# Patient Record
Sex: MALE | HISPANIC OR LATINO | Employment: FULL TIME | URBAN - METROPOLITAN AREA
[De-identification: names, ages, dates, MRNs, and addresses within clinical notes are randomized per-mention and may not be internally consistent; named-entity substitution may affect disease eponyms.]

---

## 2018-07-11 ENCOUNTER — APPOINTMENT (OUTPATIENT)
Dept: ADMISSIONS | Facility: MEDICAL CENTER | Age: 53
End: 2018-07-11
Attending: SURGERY
Payer: COMMERCIAL

## 2018-07-11 RX ORDER — ACETAMINOPHEN 500 MG
1000 TABLET ORAL DAILY
COMMUNITY
End: 2023-02-08

## 2018-07-18 ENCOUNTER — HOSPITAL ENCOUNTER (OUTPATIENT)
Facility: MEDICAL CENTER | Age: 53
End: 2018-07-18
Attending: SURGERY | Admitting: SURGERY
Payer: COMMERCIAL

## 2018-07-18 VITALS
HEART RATE: 75 BPM | TEMPERATURE: 98.3 F | HEIGHT: 64 IN | OXYGEN SATURATION: 98 % | RESPIRATION RATE: 16 BRPM | DIASTOLIC BLOOD PRESSURE: 80 MMHG | SYSTOLIC BLOOD PRESSURE: 150 MMHG | BODY MASS INDEX: 43.66 KG/M2 | WEIGHT: 255.73 LBS

## 2018-07-18 DIAGNOSIS — G89.18 POSTOPERATIVE PAIN: ICD-10-CM

## 2018-07-18 PROCEDURE — A6402 STERILE GAUZE <= 16 SQ IN: HCPCS | Performed by: SURGERY

## 2018-07-18 PROCEDURE — 160039 HCHG SURGERY MINUTES - EA ADDL 1 MIN LEVEL 3: Performed by: SURGERY

## 2018-07-18 PROCEDURE — A9270 NON-COVERED ITEM OR SERVICE: HCPCS

## 2018-07-18 PROCEDURE — 160009 HCHG ANES TIME/MIN: Performed by: SURGERY

## 2018-07-18 PROCEDURE — 700111 HCHG RX REV CODE 636 W/ 250 OVERRIDE (IP)

## 2018-07-18 PROCEDURE — 700101 HCHG RX REV CODE 250

## 2018-07-18 PROCEDURE — 160028 HCHG SURGERY MINUTES - 1ST 30 MINS LEVEL 3: Performed by: SURGERY

## 2018-07-18 PROCEDURE — 160035 HCHG PACU - 1ST 60 MINS PHASE I: Performed by: SURGERY

## 2018-07-18 PROCEDURE — 160048 HCHG OR STATISTICAL LEVEL 1-5: Performed by: SURGERY

## 2018-07-18 PROCEDURE — C1781 MESH (IMPLANTABLE): HCPCS | Performed by: SURGERY

## 2018-07-18 PROCEDURE — 160002 HCHG RECOVERY MINUTES (STAT): Performed by: SURGERY

## 2018-07-18 PROCEDURE — 700102 HCHG RX REV CODE 250 W/ 637 OVERRIDE(OP)

## 2018-07-18 PROCEDURE — 160036 HCHG PACU - EA ADDL 30 MINS PHASE I: Performed by: SURGERY

## 2018-07-18 PROCEDURE — 500380 HCHG DRAIN, PENROSE 1/4X12: Performed by: SURGERY

## 2018-07-18 PROCEDURE — 501838 HCHG SUTURE GENERAL: Performed by: SURGERY

## 2018-07-18 DEVICE — MESH PROGRIP LEFT (1/EA): Type: IMPLANTABLE DEVICE | Site: INGUINAL | Status: FUNCTIONAL

## 2018-07-18 DEVICE — MESH VENTRALEX ST W/STRAP - 4.3CM SMALL (1EA/CA): Type: IMPLANTABLE DEVICE | Site: UMBILICAL | Status: FUNCTIONAL

## 2018-07-18 RX ORDER — BUPIVACAINE HYDROCHLORIDE AND EPINEPHRINE 5; 5 MG/ML; UG/ML
INJECTION, SOLUTION PERINEURAL
Status: DISCONTINUED | OUTPATIENT
Start: 2018-07-18 | End: 2018-07-18 | Stop reason: HOSPADM

## 2018-07-18 RX ORDER — HYDROCODONE BITARTRATE AND ACETAMINOPHEN 5; 325 MG/1; MG/1
1-2 TABLET ORAL EVERY 6 HOURS PRN
Qty: 30 TAB | Refills: 0 | Status: SHIPPED | OUTPATIENT
Start: 2018-07-18 | End: 2018-07-25

## 2018-07-18 RX ORDER — SODIUM CHLORIDE, SODIUM LACTATE, POTASSIUM CHLORIDE, CALCIUM CHLORIDE 600; 310; 30; 20 MG/100ML; MG/100ML; MG/100ML; MG/100ML
INJECTION, SOLUTION INTRAVENOUS CONTINUOUS
Status: DISCONTINUED | OUTPATIENT
Start: 2018-07-18 | End: 2018-07-18 | Stop reason: HOSPADM

## 2018-07-18 RX ORDER — BUPIVACAINE HYDROCHLORIDE AND EPINEPHRINE 5; 5 MG/ML; UG/ML
INJECTION, SOLUTION EPIDURAL; INTRACAUDAL; PERINEURAL
Status: DISCONTINUED
Start: 2018-07-18 | End: 2018-07-18 | Stop reason: HOSPADM

## 2018-07-18 RX ORDER — HYDROMORPHONE HYDROCHLORIDE 2 MG/ML
INJECTION, SOLUTION INTRAMUSCULAR; INTRAVENOUS; SUBCUTANEOUS
Status: COMPLETED
Start: 2018-07-18 | End: 2018-07-18

## 2018-07-18 RX ADMIN — FENTANYL CITRATE 50 MCG: 50 INJECTION, SOLUTION INTRAMUSCULAR; INTRAVENOUS at 15:55

## 2018-07-18 RX ADMIN — HYDROMORPHONE HYDROCHLORIDE 0.2 MG: 2 INJECTION INTRAMUSCULAR; INTRAVENOUS; SUBCUTANEOUS at 17:02

## 2018-07-18 RX ADMIN — HYDROCODONE BITARTRATE AND ACETAMINOPHEN 30 ML: 2.5; 108 SOLUTION ORAL at 15:52

## 2018-07-18 RX ADMIN — HYDROMORPHONE HYDROCHLORIDE 0.2 MG: 2 INJECTION INTRAMUSCULAR; INTRAVENOUS; SUBCUTANEOUS at 17:25

## 2018-07-18 RX ADMIN — FENTANYL CITRATE 50 MCG: 50 INJECTION, SOLUTION INTRAMUSCULAR; INTRAVENOUS at 16:05

## 2018-07-18 RX ADMIN — HYDROMORPHONE HYDROCHLORIDE 0.2 MG: 2 INJECTION INTRAMUSCULAR; INTRAVENOUS; SUBCUTANEOUS at 16:44

## 2018-07-18 RX ADMIN — SODIUM CHLORIDE, SODIUM LACTATE, POTASSIUM CHLORIDE, CALCIUM CHLORIDE: 600; 310; 30; 20 INJECTION, SOLUTION INTRAVENOUS at 13:20

## 2018-07-18 ASSESSMENT — PAIN SCALES - GENERAL
PAINLEVEL_OUTOF10: 4
PAINLEVEL_OUTOF10: 8
PAINLEVEL_OUTOF10: 0
PAINLEVEL_OUTOF10: 3
PAINLEVEL_OUTOF10: 4
PAINLEVEL_OUTOF10: 8
PAINLEVEL_OUTOF10: 0

## 2018-07-18 NOTE — OP REPORT
DATE OF SERVICE:  07/18/2018    PREOPERATIVE DIAGNOSES:  1.  Umbilical hernia.  2.  Left inguinal hernia.    POSTOPERATIVE DIAGNOSES:  1.  Umbilical hernia.  2.  Left inguinal hernia.    SURGEON:  J Carlos Myers MD    ASSISTANT:  LINDA Moscoso.    PROCEDURES PERFORMED:  1.  Umbilical hernia repair.  2.  Open left inguinal hernia repair.    ANESTHESIOLOGIST:  Heath Olivo MD    ANESTHESIA:  General endotracheal.    SPECIMENS:  None.    ESTIMATED BLOOD LOSS:  30 mL    INDICATIONS:  This patient is a very pleasant 52-year-old male who presented   to my office with symptomatic umbilical hernia as well as a symptomatic left   inguinal hernia.  These were causing him pain.  We discussed repair.  We   discussed risks including bleeding, infection, recurrence, and injury to   bowel, nerves or other structures.  He understood these risks and is willing   to proceed.    FINDINGS:  There was a 1.5 cm umbilical hernia defect containing omental fat.    There was a left inguinal hernia with a large cord lipoma and a small   indirect component and the direct component as well.    PROCEDURE IN DETAIL:  Informed consent was obtained.  Patient was brought back   operating room, placed in a supine position on the operating table.  General   anesthesia was induced.  Patient's abdomen and left groin were prepped and   draped in usual sterile fashion, preincision timeout procedure was performed   and preincision antibiotics were given.  After infiltration of local   anesthetic, the infraumbilical skin incision was made, dissected down to the   subcutaneous tissues.  The umbilical skin was dissected off of the hernia   contents.  There was a large amount of preperitoneal and omental fat.    Dissected this away from the surrounding tissues and away from the fascial   edges.  The contents were reduced.  A 4 cm Ventralex mesh was used for   reconstruction.  This was sutured in place in underlay fashion using 0   Ethibond sutures.   The fascia was then closed over the top again with 0   Ethibond sutures.  The umbilical stalk was cinched down to the fascia with 3-0   Vicryl suture.  The skin was closed with deep dermal 3-0 Vicryl sutures and a   running subcuticular 4-0 Vicryl.  Wound was dressed with benzoin,   Steri-Strips, cotton ball and Tegaderm.    We then turned our attention to the inguinal hernia.  After infiltration of   local anesthetic and an ilioinguinal nerve block, a skin incision was made in   the left groin.  We dissected down through the subcutaneous tissues to the   external oblique which was incised up to the external ring.  The cord   structures and hernia contents were encircled at the pubic tubercle with a   Penrose drain.  We dissected away large cord lipoma which was transected at   the level of the internal ring.  There were small direct component which was   dissected away from the cord structures.  There also appeared to be some   laxity in the area of a direct hernia.  The inguinal canal was reconstructed   using a ProGrip mesh.  This was tacked to the pubic tubercle with 2-0 Ethibond   sutures.  The wings of the mesh were brought around the cord structures and placed on   the inguinal shelving edge inferiorly and the conjoined tendon superiorly.    The mesh was brought down around the cord structures to recreate the internal   ring.  The external oblique was closed over the top to recreate the external   ring.  Urbano fascia was closed with interrupted 3-0 Vicryl sutures.  The skin   incision was closed with a running subcuticular 4-0 Vicryl.  The wound was   dressed with benzoin, Steri-Strips, gauze and Tegaderm.  Patient tolerated the   procedure well, was extubated and taken to recovery unit in a stable   condition.  All sponge and instrument counts correct.       ____________________________________     MD PRASHANT Foster / SURYA    DD:  07/18/2018 15:43:06  DT:  07/18/2018 16:18:31    D#:  [id number]  Job#:   [id number]

## 2018-07-18 NOTE — DISCHARGE SUMMARY
Hernia Instrucciones:    1. ACTIVIDADES: A vanessa salida del hospital, el día de la cirugía se solicita que lo hace ninguna actividad física significativa y limitar las actividades mentales, fransisco usted ha tenido la sedación. El día después de la cirugía, usted puede reanudar poly actividades de la david diaria, pedro luis endy cuatro semanas, se recomienda que usted lo hace no hay actividades extenuantes o levantar objetos pesados ??(de más de 15 libras).    2. CONDUCCIÓN: Es posible que conduce cada vez que están fuera de medicamentos para el dolor y son capaces de realizar las actividades necesarias para conducir, es decir, torneado, flexión, torsión, etc.    3. HERIDA: No es raro que los pacientes experimentan hinchazón e incluso hematomas en el sitio de la reparación de la hernia. Con las hernias inguinales, a veces los moretones y la hinchazón pueden extenderse en el pene o en el escroto de los pacientes masculinos. Short Pump se resolverá en los próximos días.    4. ICE: por favor, utilice hielo en la herida para disminuir la hinchazón endy las primeras 24 horas y luego interrumpir.    5. Baño: El apósito se puede quitar dos días después de la cirugía y la herida puede entonces ser humedecido en ashu ducha fransisco normal, pedro luis evitar la inmersión en agua (baño de riki) endy al menos ashu semana.    6. DOLOR DE MEDICAMENTOS: Se le dará ashu receta para medicamentos para el dolor al momento del rosetta. Por favor, tome esto fransisco se indica. Es importante recordar que no debe mendez medicamentos con el estómago vacío ya que esto puede causar náuseas.    7. función intestinal: Después de la reparación de la hernia, no es infrecuente que los pacientes experimentan estreñimiento. Short Pump se debe a la disminución de los niveles de actividad, así fransisco medicamentos para el dolor. Es posible que desee utilizar un ablandador fecal que comienza inmediatamente después de la cirugía, y es posible o no posible que necesite usar un laxante (leche  de magnesia, Ex-Lax; Senokot, etc.) también.    8 Llama SI TIENE: (1) La fiebre a más de 1.010 M, (2) el pecho inusual o dolor en las piernas, (3) Drenaje o líquido desde la incisión que puede ser mal olor, aumento de sensibilidad o dolor en la herida o la herida bordes ya no están juntos, enrojecimiento o hinchazón en el sitio de la incisión. Por favor, no dude en llamar con cualquier otra pregunta.    9. EDI: Póngase en contacto con nuestra oficina al [phone] para ashu edi de seguimiento de 1 a 2 semanas después de vanessa procedimiento.    Si usted tiene alguna pregunta adicional, por favor no dude en llamar a la oficina y hablar con mí o el médico de anna.    Dirección de la oficina:  [address], [place] [address]      J Carlos GARCIA.

## 2018-07-18 NOTE — OR NURSING
1539  Pt arrived to PACU from OR with Anesthesia and RN.  No airway in place.  5L O2 via mask.  VSS.  Gauze drsgs observed to umbilicus and L groin sites.  Abd and groin soft to palpation, no ecchymoses noted.    1552  Oral hydrocodone given for moderate pain    1555  Fentanyl given for persistent moderate pain    1605  Second dose of fentanyl given for constant pain.  Pt restless, fidgety, moaning and bracing abd.    1620  Pt sleeping comfortably.    1644 Report to Kaylynn CASTRO.

## 2018-07-18 NOTE — OR NURSING
1644  Dilaudid given for persistent moderate pain.  Wife at bedside.    1655  Pt moved to bed 8 in PACU.  Tolerated the transfer well.      1702  Second Dilaudid dose given for persistent pain    1710  30min trial on RA starts now    1717  Report given to Mimi CASTRO

## 2019-09-04 ENCOUNTER — HOSPITAL ENCOUNTER (OUTPATIENT)
Dept: LAB | Facility: MEDICAL CENTER | Age: 54
End: 2019-09-04
Attending: ORTHOPAEDIC SURGERY
Payer: COMMERCIAL

## 2019-09-04 LAB
EST. AVERAGE GLUCOSE BLD GHB EST-MCNC: 137 MG/DL
HBA1C MFR BLD: 6.4 % (ref 0–5.6)

## 2019-09-04 PROCEDURE — 83036 HEMOGLOBIN GLYCOSYLATED A1C: CPT

## 2019-09-04 PROCEDURE — 36415 COLL VENOUS BLD VENIPUNCTURE: CPT

## 2019-09-05 ENCOUNTER — HOSPITAL ENCOUNTER (OUTPATIENT)
Dept: LAB | Facility: MEDICAL CENTER | Age: 54
End: 2019-09-05
Attending: NURSE PRACTITIONER
Payer: COMMERCIAL

## 2019-09-05 LAB
ALBUMIN SERPL BCP-MCNC: 4.2 G/DL (ref 3.2–4.9)
ALBUMIN/GLOB SERPL: 1.2 G/DL
ALP SERPL-CCNC: 61 U/L (ref 30–99)
ALT SERPL-CCNC: 30 U/L (ref 2–50)
ANION GAP SERPL CALC-SCNC: 11 MMOL/L (ref 0–11.9)
AST SERPL-CCNC: 26 U/L (ref 12–45)
BASOPHILS # BLD AUTO: 0.8 % (ref 0–1.8)
BASOPHILS # BLD: 0.08 K/UL (ref 0–0.12)
BILIRUB SERPL-MCNC: 0.7 MG/DL (ref 0.1–1.5)
BUN SERPL-MCNC: 17 MG/DL (ref 8–22)
CALCIUM SERPL-MCNC: 9.3 MG/DL (ref 8.5–10.5)
CHLORIDE SERPL-SCNC: 103 MMOL/L (ref 96–112)
CHOLEST SERPL-MCNC: 139 MG/DL (ref 100–199)
CO2 SERPL-SCNC: 24 MMOL/L (ref 20–33)
CREAT SERPL-MCNC: 0.95 MG/DL (ref 0.5–1.4)
EOSINOPHIL # BLD AUTO: 0.15 K/UL (ref 0–0.51)
EOSINOPHIL NFR BLD: 1.5 % (ref 0–6.9)
ERYTHROCYTE [DISTWIDTH] IN BLOOD BY AUTOMATED COUNT: 43.3 FL (ref 35.9–50)
EST. AVERAGE GLUCOSE BLD GHB EST-MCNC: 137 MG/DL
GLOBULIN SER CALC-MCNC: 3.4 G/DL (ref 1.9–3.5)
GLUCOSE SERPL-MCNC: 80 MG/DL (ref 65–99)
HBA1C MFR BLD: 6.4 % (ref 0–5.6)
HCT VFR BLD AUTO: 45.9 % (ref 42–52)
HDLC SERPL-MCNC: 45 MG/DL
HGB BLD-MCNC: 15 G/DL (ref 14–18)
IMM GRANULOCYTES # BLD AUTO: 0.03 K/UL (ref 0–0.11)
IMM GRANULOCYTES NFR BLD AUTO: 0.3 % (ref 0–0.9)
LDLC SERPL CALC-MCNC: 80 MG/DL
LYMPHOCYTES # BLD AUTO: 3.62 K/UL (ref 1–4.8)
LYMPHOCYTES NFR BLD: 37.1 % (ref 22–41)
MCH RBC QN AUTO: 30.5 PG (ref 27–33)
MCHC RBC AUTO-ENTMCNC: 32.7 G/DL (ref 33.7–35.3)
MCV RBC AUTO: 93.3 FL (ref 81.4–97.8)
MONOCYTES # BLD AUTO: 0.62 K/UL (ref 0–0.85)
MONOCYTES NFR BLD AUTO: 6.3 % (ref 0–13.4)
NEUTROPHILS # BLD AUTO: 5.27 K/UL (ref 1.82–7.42)
NEUTROPHILS NFR BLD: 54 % (ref 44–72)
NRBC # BLD AUTO: 0 K/UL
NRBC BLD-RTO: 0 /100 WBC
PLATELET # BLD AUTO: 257 K/UL (ref 164–446)
PMV BLD AUTO: 10.3 FL (ref 9–12.9)
POTASSIUM SERPL-SCNC: 3.9 MMOL/L (ref 3.6–5.5)
PROT SERPL-MCNC: 7.6 G/DL (ref 6–8.2)
RBC # BLD AUTO: 4.92 M/UL (ref 4.7–6.1)
SODIUM SERPL-SCNC: 138 MMOL/L (ref 135–145)
TRIGL SERPL-MCNC: 70 MG/DL (ref 0–149)
WBC # BLD AUTO: 9.8 K/UL (ref 4.8–10.8)

## 2019-09-05 PROCEDURE — 83036 HEMOGLOBIN GLYCOSYLATED A1C: CPT

## 2019-09-05 PROCEDURE — 36415 COLL VENOUS BLD VENIPUNCTURE: CPT

## 2019-09-05 PROCEDURE — 80053 COMPREHEN METABOLIC PANEL: CPT

## 2019-09-05 PROCEDURE — 85025 COMPLETE CBC W/AUTO DIFF WBC: CPT

## 2019-09-05 PROCEDURE — 80061 LIPID PANEL: CPT

## 2019-09-23 ENCOUNTER — HOSPITAL ENCOUNTER (OUTPATIENT)
Dept: RADIOLOGY | Facility: MEDICAL CENTER | Age: 54
End: 2019-09-23
Attending: PHYSICIAN ASSISTANT
Payer: MEDICAID

## 2019-09-23 ENCOUNTER — HOSPITAL ENCOUNTER (OUTPATIENT)
Dept: LAB | Facility: MEDICAL CENTER | Age: 54
End: 2019-09-23
Attending: PHYSICIAN ASSISTANT
Payer: MEDICAID

## 2019-09-23 ENCOUNTER — APPOINTMENT (OUTPATIENT)
Dept: ADMISSIONS | Facility: MEDICAL CENTER | Age: 54
DRG: 469 | End: 2019-09-23
Payer: COMMERCIAL

## 2019-09-23 DIAGNOSIS — M25.552 LEFT HIP PAIN: ICD-10-CM

## 2019-09-23 LAB
APPEARANCE FLD: CLEAR
BODY FLD TYPE: NORMAL
BODY FLD TYPE: NORMAL
COLOR FLD: COLORLESS
CRYSTALS FLD MICRO: NORMAL
GRAM STN SPEC: NORMAL
LYMPHOCYTES NFR FLD: 1 %
RBC # FLD: 53 CELLS/UL
SIGNIFICANT IND 70042: NORMAL
SITE SITE: NORMAL
SOURCE SOURCE: NORMAL
WBC # FLD: 1 CELLS/UL

## 2019-09-23 PROCEDURE — 89051 BODY FLUID CELL COUNT: CPT

## 2019-09-23 PROCEDURE — 87075 CULTR BACTERIA EXCEPT BLOOD: CPT

## 2019-09-23 PROCEDURE — 87102 FUNGUS ISOLATION CULTURE: CPT

## 2019-09-23 PROCEDURE — 87205 SMEAR GRAM STAIN: CPT

## 2019-09-23 PROCEDURE — 89060 EXAM SYNOVIAL FLUID CRYSTALS: CPT

## 2019-09-23 PROCEDURE — 700117 HCHG RX CONTRAST REV CODE 255: Performed by: PHYSICIAN ASSISTANT

## 2019-09-23 PROCEDURE — 77002 NEEDLE LOCALIZATION BY XRAY: CPT

## 2019-09-23 PROCEDURE — 20610 DRAIN/INJ JOINT/BURSA W/O US: CPT | Mod: LT

## 2019-09-23 PROCEDURE — 87070 CULTURE OTHR SPECIMN AEROBIC: CPT

## 2019-09-23 RX ORDER — TRIAMCINOLONE ACETONIDE 40 MG/ML
40 INJECTION, SUSPENSION INTRA-ARTICULAR; INTRAMUSCULAR ONCE
Status: DISCONTINUED | OUTPATIENT
Start: 2019-09-23 | End: 2019-09-23

## 2019-09-23 RX ORDER — BUPIVACAINE HYDROCHLORIDE 5 MG/ML
10 INJECTION, SOLUTION EPIDURAL; INTRACAUDAL ONCE
Status: DISCONTINUED | OUTPATIENT
Start: 2019-09-23 | End: 2019-09-24 | Stop reason: HOSPADM

## 2019-09-23 RX ADMIN — IOHEXOL 10 ML: 300 INJECTION, SOLUTION INTRAVENOUS at 15:00

## 2019-09-24 ENCOUNTER — ANESTHESIA EVENT (OUTPATIENT)
Dept: SURGERY | Facility: MEDICAL CENTER | Age: 54
DRG: 469 | End: 2019-09-24
Payer: COMMERCIAL

## 2019-09-24 ENCOUNTER — APPOINTMENT (OUTPATIENT)
Dept: ADMISSIONS | Facility: MEDICAL CENTER | Age: 54
DRG: 469 | End: 2019-09-24
Attending: ORTHOPAEDIC SURGERY
Payer: COMMERCIAL

## 2019-09-24 DIAGNOSIS — Z01.810 PRE-OPERATIVE CARDIOVASCULAR EXAMINATION: ICD-10-CM

## 2019-09-24 DIAGNOSIS — Z01.812 PRE-OPERATIVE LABORATORY EXAMINATION: ICD-10-CM

## 2019-09-24 PROCEDURE — 36415 COLL VENOUS BLD VENIPUNCTURE: CPT

## 2019-09-24 PROCEDURE — 85730 THROMBOPLASTIN TIME PARTIAL: CPT

## 2019-09-24 PROCEDURE — 87389 HIV-1 AG W/HIV-1&-2 AB AG IA: CPT

## 2019-09-24 PROCEDURE — 87641 MR-STAPH DNA AMP PROBE: CPT

## 2019-09-24 PROCEDURE — 85610 PROTHROMBIN TIME: CPT

## 2019-09-24 PROCEDURE — 87640 STAPH A DNA AMP PROBE: CPT

## 2019-09-24 RX ORDER — GABAPENTIN 100 MG/1
200 CAPSULE ORAL 2 TIMES DAILY
Refills: 1 | COMMUNITY
Start: 2019-08-17 | End: 2023-02-08

## 2019-09-24 NOTE — OR NURSING
Hx and meds reviewed, pre op instructions given. Pt aware may take the listed meds morning of surgery; tylenol if needed and gabapentin. Anesthesia fasting guidelines reviewed with pt. Pt has stop bang score of 6, protocol in place.

## 2019-09-25 ENCOUNTER — ANESTHESIA (OUTPATIENT)
Dept: SURGERY | Facility: MEDICAL CENTER | Age: 54
DRG: 469 | End: 2019-09-25
Payer: COMMERCIAL

## 2019-09-25 ENCOUNTER — HOSPITAL ENCOUNTER (INPATIENT)
Facility: MEDICAL CENTER | Age: 54
LOS: 1 days | DRG: 469 | End: 2019-09-26
Attending: ORTHOPAEDIC SURGERY | Admitting: ORTHOPAEDIC SURGERY
Payer: COMMERCIAL

## 2019-09-25 ENCOUNTER — APPOINTMENT (OUTPATIENT)
Dept: RADIOLOGY | Facility: MEDICAL CENTER | Age: 54
DRG: 469 | End: 2019-09-25
Attending: ORTHOPAEDIC SURGERY
Payer: COMMERCIAL

## 2019-09-25 DIAGNOSIS — M87.052 ASEPTIC NECROSIS OF BONE OF LEFT HIP (HCC): ICD-10-CM

## 2019-09-25 DIAGNOSIS — S72.052A CLOSED FRACTURE OF HEAD OF LEFT FEMUR, INITIAL ENCOUNTER (HCC): ICD-10-CM

## 2019-09-25 PROBLEM — E11.9 TYPE 2 DIABETES MELLITUS, WITHOUT LONG-TERM CURRENT USE OF INSULIN (HCC): Status: ACTIVE | Noted: 2019-09-25

## 2019-09-25 PROBLEM — G89.29 CHRONIC PAIN: Status: ACTIVE | Noted: 2019-09-25

## 2019-09-25 PROBLEM — G47.33 OBSTRUCTIVE SLEEP APNEA SYNDROME: Status: ACTIVE | Noted: 2019-09-25

## 2019-09-25 LAB
APTT PPP: 30.5 SEC (ref 24.7–36)
HIV 1+2 AB+HIV1 P24 AG SERPL QL IA: NON REACTIVE
INR PPP: 1.04 (ref 0.87–1.13)
PROTHROMBIN TIME: 13.8 SEC (ref 12–14.6)
SCCMEC + MECA PNL NOSE NAA+PROBE: NEGATIVE
SCCMEC + MECA PNL NOSE NAA+PROBE: NEGATIVE

## 2019-09-25 PROCEDURE — A9270 NON-COVERED ITEM OR SERVICE: HCPCS | Performed by: ANESTHESIOLOGY

## 2019-09-25 PROCEDURE — 700101 HCHG RX REV CODE 250

## 2019-09-25 PROCEDURE — 160042 HCHG SURGERY MINUTES - EA ADDL 1 MIN LEVEL 5: Performed by: ORTHOPAEDIC SURGERY

## 2019-09-25 PROCEDURE — 700101 HCHG RX REV CODE 250: Performed by: ORTHOPAEDIC SURGERY

## 2019-09-25 PROCEDURE — 700101 HCHG RX REV CODE 250: Performed by: ANESTHESIOLOGY

## 2019-09-25 PROCEDURE — 501411 HCHG SPONGE, BABY LAP W/O RINGS: Performed by: ORTHOPAEDIC SURGERY

## 2019-09-25 PROCEDURE — 700105 HCHG RX REV CODE 258: Performed by: ORTHOPAEDIC SURGERY

## 2019-09-25 PROCEDURE — 502000 HCHG MISC OR IMPLANTS RC 0278: Performed by: ORTHOPAEDIC SURGERY

## 2019-09-25 PROCEDURE — 700102 HCHG RX REV CODE 250 W/ 637 OVERRIDE(OP): Performed by: PHYSICIAN ASSISTANT

## 2019-09-25 PROCEDURE — 700112 HCHG RX REV CODE 229: Performed by: PHYSICIAN ASSISTANT

## 2019-09-25 PROCEDURE — 700102 HCHG RX REV CODE 250 W/ 637 OVERRIDE(OP): Performed by: ANESTHESIOLOGY

## 2019-09-25 PROCEDURE — 72170 X-RAY EXAM OF PELVIS: CPT

## 2019-09-25 PROCEDURE — 302135 SEQUENTIAL COMPRESSION MACHINE: Performed by: ORTHOPAEDIC SURGERY

## 2019-09-25 PROCEDURE — 502240 HCHG MISC OR SUPPLY RC 0272: Performed by: ORTHOPAEDIC SURGERY

## 2019-09-25 PROCEDURE — 700111 HCHG RX REV CODE 636 W/ 250 OVERRIDE (IP): Performed by: PHYSICIAN ASSISTANT

## 2019-09-25 PROCEDURE — 700111 HCHG RX REV CODE 636 W/ 250 OVERRIDE (IP): Performed by: ANESTHESIOLOGY

## 2019-09-25 PROCEDURE — 502578 HCHG PACK, TOTAL HIP: Performed by: ORTHOPAEDIC SURGERY

## 2019-09-25 PROCEDURE — 700105 HCHG RX REV CODE 258: Performed by: ANESTHESIOLOGY

## 2019-09-25 PROCEDURE — 160002 HCHG RECOVERY MINUTES (STAT): Performed by: ORTHOPAEDIC SURGERY

## 2019-09-25 PROCEDURE — A9270 NON-COVERED ITEM OR SERVICE: HCPCS | Performed by: PHYSICIAN ASSISTANT

## 2019-09-25 PROCEDURE — 501838 HCHG SUTURE GENERAL: Performed by: ORTHOPAEDIC SURGERY

## 2019-09-25 PROCEDURE — 700111 HCHG RX REV CODE 636 W/ 250 OVERRIDE (IP): Performed by: ORTHOPAEDIC SURGERY

## 2019-09-25 PROCEDURE — 0SRB04Z REPLACEMENT OF LEFT HIP JOINT WITH CERAMIC ON POLYETHYLENE SYNTHETIC SUBSTITUTE, OPEN APPROACH: ICD-10-PCS | Performed by: ORTHOPAEDIC SURGERY

## 2019-09-25 PROCEDURE — 160036 HCHG PACU - EA ADDL 30 MINS PHASE I: Performed by: ORTHOPAEDIC SURGERY

## 2019-09-25 PROCEDURE — 110372 HCHG SHELL REV 278: Performed by: ORTHOPAEDIC SURGERY

## 2019-09-25 PROCEDURE — 160035 HCHG PACU - 1ST 60 MINS PHASE I: Performed by: ORTHOPAEDIC SURGERY

## 2019-09-25 PROCEDURE — 94760 N-INVAS EAR/PLS OXIMETRY 1: CPT

## 2019-09-25 PROCEDURE — 770001 HCHG ROOM/CARE - MED/SURG/GYN PRIV*

## 2019-09-25 PROCEDURE — 700105 HCHG RX REV CODE 258: Performed by: PHYSICIAN ASSISTANT

## 2019-09-25 PROCEDURE — 73501 X-RAY EXAM HIP UNI 1 VIEW: CPT | Mod: LT

## 2019-09-25 PROCEDURE — 700101 HCHG RX REV CODE 250: Performed by: PHYSICIAN ASSISTANT

## 2019-09-25 PROCEDURE — 160031 HCHG SURGERY MINUTES - 1ST 30 MINS LEVEL 5: Performed by: ORTHOPAEDIC SURGERY

## 2019-09-25 PROCEDURE — 160048 HCHG OR STATISTICAL LEVEL 1-5: Performed by: ORTHOPAEDIC SURGERY

## 2019-09-25 PROCEDURE — 160009 HCHG ANES TIME/MIN: Performed by: ORTHOPAEDIC SURGERY

## 2019-09-25 PROCEDURE — 0QS704Z REPOSITION LEFT UPPER FEMUR WITH INTERNAL FIXATION DEVICE, OPEN APPROACH: ICD-10-PCS | Performed by: ORTHOPAEDIC SURGERY

## 2019-09-25 DEVICE — IMPLANTABLE DEVICE: Type: IMPLANTABLE DEVICE | Site: HIP | Status: FUNCTIONAL

## 2019-09-25 DEVICE — HIP DALL MILES SET 2.0 SLEEVE: Type: IMPLANTABLE DEVICE | Site: HIP | Status: FUNCTIONAL

## 2019-09-25 RX ORDER — DIPHENHYDRAMINE HYDROCHLORIDE 50 MG/ML
12.5 INJECTION INTRAMUSCULAR; INTRAVENOUS
Status: DISCONTINUED | OUTPATIENT
Start: 2019-09-25 | End: 2019-09-26 | Stop reason: HOSPADM

## 2019-09-25 RX ORDER — BUPIVACAINE HYDROCHLORIDE AND EPINEPHRINE 2.5; 5 MG/ML; UG/ML
INJECTION, SOLUTION EPIDURAL; INFILTRATION; INTRACAUDAL; PERINEURAL
Status: DISCONTINUED | OUTPATIENT
Start: 2019-09-25 | End: 2019-09-25 | Stop reason: HOSPADM

## 2019-09-25 RX ORDER — KETOROLAC TROMETHAMINE 30 MG/ML
15 INJECTION, SOLUTION INTRAMUSCULAR; INTRAVENOUS ONCE
Status: COMPLETED | OUTPATIENT
Start: 2019-09-25 | End: 2019-09-25

## 2019-09-25 RX ORDER — GABAPENTIN 100 MG/1
200 CAPSULE ORAL 2 TIMES DAILY
Status: DISCONTINUED | OUTPATIENT
Start: 2019-09-25 | End: 2019-09-26 | Stop reason: HOSPADM

## 2019-09-25 RX ORDER — TRANEXAMIC ACID 100 MG/ML
INJECTION, SOLUTION INTRAVENOUS
Status: COMPLETED
Start: 2019-09-25 | End: 2019-09-25

## 2019-09-25 RX ORDER — HYDROMORPHONE HYDROCHLORIDE 1 MG/ML
0.5 INJECTION, SOLUTION INTRAMUSCULAR; INTRAVENOUS; SUBCUTANEOUS
Status: DISCONTINUED | OUTPATIENT
Start: 2019-09-25 | End: 2019-09-26 | Stop reason: HOSPADM

## 2019-09-25 RX ORDER — ACETAMINOPHEN 500 MG
1000 TABLET ORAL ONCE
Status: COMPLETED | OUTPATIENT
Start: 2019-09-25 | End: 2019-09-25

## 2019-09-25 RX ORDER — AMOXICILLIN 250 MG
1 CAPSULE ORAL
Status: DISCONTINUED | OUTPATIENT
Start: 2019-09-25 | End: 2019-09-26 | Stop reason: HOSPADM

## 2019-09-25 RX ORDER — DEXTROSE MONOHYDRATE, SODIUM CHLORIDE, AND POTASSIUM CHLORIDE 50; 1.49; 4.5 G/1000ML; G/1000ML; G/1000ML
INJECTION, SOLUTION INTRAVENOUS CONTINUOUS
Status: DISPENSED | OUTPATIENT
Start: 2019-09-25 | End: 2019-09-25

## 2019-09-25 RX ORDER — ONDANSETRON 4 MG/1
4 TABLET, ORALLY DISINTEGRATING ORAL EVERY 4 HOURS PRN
Status: DISCONTINUED | OUTPATIENT
Start: 2019-09-25 | End: 2019-09-26 | Stop reason: HOSPADM

## 2019-09-25 RX ORDER — DIPHENHYDRAMINE HYDROCHLORIDE 50 MG/ML
25 INJECTION INTRAMUSCULAR; INTRAVENOUS EVERY 6 HOURS PRN
Status: DISCONTINUED | OUTPATIENT
Start: 2019-09-25 | End: 2019-09-26 | Stop reason: HOSPADM

## 2019-09-25 RX ORDER — HALOPERIDOL 5 MG/ML
1 INJECTION INTRAMUSCULAR
Status: DISCONTINUED | OUTPATIENT
Start: 2019-09-25 | End: 2019-09-26 | Stop reason: HOSPADM

## 2019-09-25 RX ORDER — DEXAMETHASONE SODIUM PHOSPHATE 4 MG/ML
INJECTION, SOLUTION INTRA-ARTICULAR; INTRALESIONAL; INTRAMUSCULAR; INTRAVENOUS; SOFT TISSUE PRN
Status: DISCONTINUED | OUTPATIENT
Start: 2019-09-25 | End: 2019-09-25 | Stop reason: SURG

## 2019-09-25 RX ORDER — SODIUM CHLORIDE, SODIUM LACTATE, POTASSIUM CHLORIDE, CALCIUM CHLORIDE 600; 310; 30; 20 MG/100ML; MG/100ML; MG/100ML; MG/100ML
INJECTION, SOLUTION INTRAVENOUS
Status: DISCONTINUED | OUTPATIENT
Start: 2019-09-25 | End: 2019-09-25 | Stop reason: SURG

## 2019-09-25 RX ORDER — AMOXICILLIN 250 MG
1 CAPSULE ORAL NIGHTLY
Status: DISCONTINUED | OUTPATIENT
Start: 2019-09-25 | End: 2019-09-26 | Stop reason: HOSPADM

## 2019-09-25 RX ORDER — CHLORPROMAZINE HYDROCHLORIDE 25 MG/1
25 TABLET, FILM COATED ORAL EVERY 6 HOURS PRN
Status: DISCONTINUED | OUTPATIENT
Start: 2019-09-25 | End: 2019-09-26 | Stop reason: HOSPADM

## 2019-09-25 RX ORDER — TRAMADOL HYDROCHLORIDE 50 MG/1
50 TABLET ORAL EVERY 4 HOURS PRN
Status: DISCONTINUED | OUTPATIENT
Start: 2019-09-25 | End: 2019-09-26 | Stop reason: HOSPADM

## 2019-09-25 RX ORDER — TRANEXAMIC ACID 100 MG/ML
INJECTION, SOLUTION INTRAVENOUS
Status: COMPLETED | OUTPATIENT
Start: 2019-09-25 | End: 2019-09-25

## 2019-09-25 RX ORDER — ACETAMINOPHEN 500 MG
500 TABLET ORAL EVERY 6 HOURS
Status: DISCONTINUED | OUTPATIENT
Start: 2019-09-25 | End: 2019-09-26 | Stop reason: HOSPADM

## 2019-09-25 RX ORDER — HYDROMORPHONE HYDROCHLORIDE 2 MG/ML
INJECTION, SOLUTION INTRAMUSCULAR; INTRAVENOUS; SUBCUTANEOUS PRN
Status: DISCONTINUED | OUTPATIENT
Start: 2019-09-25 | End: 2019-09-25 | Stop reason: SURG

## 2019-09-25 RX ORDER — HYDROMORPHONE HYDROCHLORIDE 2 MG/ML
0.4 INJECTION, SOLUTION INTRAMUSCULAR; INTRAVENOUS; SUBCUTANEOUS
Status: DISCONTINUED | OUTPATIENT
Start: 2019-09-25 | End: 2019-09-26 | Stop reason: HOSPADM

## 2019-09-25 RX ORDER — CELECOXIB 200 MG/1
200 CAPSULE ORAL 2 TIMES DAILY
Status: DISCONTINUED | OUTPATIENT
Start: 2019-09-26 | End: 2019-09-26 | Stop reason: HOSPADM

## 2019-09-25 RX ORDER — KETOROLAC TROMETHAMINE 30 MG/ML
INJECTION, SOLUTION INTRAMUSCULAR; INTRAVENOUS
Status: DISCONTINUED | OUTPATIENT
Start: 2019-09-25 | End: 2019-09-25 | Stop reason: HOSPADM

## 2019-09-25 RX ORDER — POLYETHYLENE GLYCOL 3350 17 G/17G
1 POWDER, FOR SOLUTION ORAL 2 TIMES DAILY PRN
Status: DISCONTINUED | OUTPATIENT
Start: 2019-09-25 | End: 2019-09-26 | Stop reason: HOSPADM

## 2019-09-25 RX ORDER — HYDROMORPHONE HYDROCHLORIDE 2 MG/ML
0.2 INJECTION, SOLUTION INTRAMUSCULAR; INTRAVENOUS; SUBCUTANEOUS
Status: DISCONTINUED | OUTPATIENT
Start: 2019-09-25 | End: 2019-09-26 | Stop reason: HOSPADM

## 2019-09-25 RX ORDER — ONDANSETRON 2 MG/ML
INJECTION INTRAMUSCULAR; INTRAVENOUS PRN
Status: DISCONTINUED | OUTPATIENT
Start: 2019-09-25 | End: 2019-09-25 | Stop reason: SURG

## 2019-09-25 RX ORDER — HALOPERIDOL 5 MG/ML
1 INJECTION INTRAMUSCULAR EVERY 6 HOURS PRN
Status: DISCONTINUED | OUTPATIENT
Start: 2019-09-25 | End: 2019-09-26 | Stop reason: HOSPADM

## 2019-09-25 RX ORDER — CHLORPROMAZINE HYDROCHLORIDE 25 MG/ML
25 INJECTION INTRAMUSCULAR EVERY 6 HOURS PRN
Status: DISCONTINUED | OUTPATIENT
Start: 2019-09-25 | End: 2019-09-26 | Stop reason: HOSPADM

## 2019-09-25 RX ORDER — OXYCODONE HYDROCHLORIDE 5 MG/1
5 TABLET ORAL
Status: DISCONTINUED | OUTPATIENT
Start: 2019-09-25 | End: 2019-09-26 | Stop reason: HOSPADM

## 2019-09-25 RX ORDER — ONDANSETRON 2 MG/ML
4 INJECTION INTRAMUSCULAR; INTRAVENOUS
Status: DISCONTINUED | OUTPATIENT
Start: 2019-09-25 | End: 2019-09-26 | Stop reason: HOSPADM

## 2019-09-25 RX ORDER — ONDANSETRON 2 MG/ML
4 INJECTION INTRAMUSCULAR; INTRAVENOUS EVERY 4 HOURS PRN
Status: DISCONTINUED | OUTPATIENT
Start: 2019-09-25 | End: 2019-09-26 | Stop reason: HOSPADM

## 2019-09-25 RX ORDER — KETOROLAC TROMETHAMINE 30 MG/ML
15 INJECTION, SOLUTION INTRAMUSCULAR; INTRAVENOUS EVERY 6 HOURS
Status: DISCONTINUED | OUTPATIENT
Start: 2019-09-25 | End: 2019-09-26 | Stop reason: HOSPADM

## 2019-09-25 RX ORDER — TRANEXAMIC ACID 100 MG/ML
INJECTION, SOLUTION INTRAVENOUS PRN
Status: DISCONTINUED | OUTPATIENT
Start: 2019-09-25 | End: 2019-09-25 | Stop reason: SURG

## 2019-09-25 RX ORDER — DEXAMETHASONE SODIUM PHOSPHATE 4 MG/ML
8 INJECTION, SOLUTION INTRA-ARTICULAR; INTRALESIONAL; INTRAMUSCULAR; INTRAVENOUS; SOFT TISSUE ONCE
Status: COMPLETED | OUTPATIENT
Start: 2019-09-26 | End: 2019-09-26

## 2019-09-25 RX ORDER — LIDOCAINE HYDROCHLORIDE 20 MG/ML
INJECTION, SOLUTION EPIDURAL; INFILTRATION; INTRACAUDAL; PERINEURAL PRN
Status: DISCONTINUED | OUTPATIENT
Start: 2019-09-25 | End: 2019-09-25 | Stop reason: HOSPADM

## 2019-09-25 RX ORDER — DEXAMETHASONE SODIUM PHOSPHATE 4 MG/ML
4 INJECTION, SOLUTION INTRA-ARTICULAR; INTRALESIONAL; INTRAMUSCULAR; INTRAVENOUS; SOFT TISSUE
Status: DISCONTINUED | OUTPATIENT
Start: 2019-09-25 | End: 2019-09-26 | Stop reason: HOSPADM

## 2019-09-25 RX ORDER — OXYCODONE HYDROCHLORIDE 10 MG/1
10 TABLET ORAL
Status: DISCONTINUED | OUTPATIENT
Start: 2019-09-25 | End: 2019-09-26 | Stop reason: HOSPADM

## 2019-09-25 RX ORDER — ENEMA 19; 7 G/133ML; G/133ML
1 ENEMA RECTAL
Status: DISCONTINUED | OUTPATIENT
Start: 2019-09-25 | End: 2019-09-26 | Stop reason: HOSPADM

## 2019-09-25 RX ORDER — CEFAZOLIN SODIUM 1 G/3ML
INJECTION, POWDER, FOR SOLUTION INTRAMUSCULAR; INTRAVENOUS PRN
Status: DISCONTINUED | OUTPATIENT
Start: 2019-09-25 | End: 2019-09-25 | Stop reason: SURG

## 2019-09-25 RX ORDER — DOCUSATE SODIUM 100 MG/1
100 CAPSULE, LIQUID FILLED ORAL 2 TIMES DAILY
Status: DISCONTINUED | OUTPATIENT
Start: 2019-09-25 | End: 2019-09-26 | Stop reason: HOSPADM

## 2019-09-25 RX ORDER — DEXMEDETOMIDINE HYDROCHLORIDE 100 UG/ML
INJECTION, SOLUTION INTRAVENOUS PRN
Status: DISCONTINUED | OUTPATIENT
Start: 2019-09-25 | End: 2019-09-25 | Stop reason: SURG

## 2019-09-25 RX ORDER — OXYCODONE HCL 5 MG/5 ML
5 SOLUTION, ORAL ORAL
Status: COMPLETED | OUTPATIENT
Start: 2019-09-25 | End: 2019-09-25

## 2019-09-25 RX ORDER — OXYCODONE HCL 5 MG/5 ML
10 SOLUTION, ORAL ORAL
Status: COMPLETED | OUTPATIENT
Start: 2019-09-25 | End: 2019-09-25

## 2019-09-25 RX ORDER — SCOLOPAMINE TRANSDERMAL SYSTEM 1 MG/1
1 PATCH, EXTENDED RELEASE TRANSDERMAL
Status: DISCONTINUED | OUTPATIENT
Start: 2019-09-25 | End: 2019-09-25 | Stop reason: HOSPADM

## 2019-09-25 RX ORDER — BISACODYL 10 MG
10 SUPPOSITORY, RECTAL RECTAL
Status: DISCONTINUED | OUTPATIENT
Start: 2019-09-25 | End: 2019-09-26 | Stop reason: HOSPADM

## 2019-09-25 RX ORDER — TAMSULOSIN HYDROCHLORIDE 0.4 MG/1
0.4 CAPSULE ORAL
Status: DISCONTINUED | OUTPATIENT
Start: 2019-09-25 | End: 2019-09-26 | Stop reason: HOSPADM

## 2019-09-25 RX ORDER — SODIUM CHLORIDE, SODIUM LACTATE, POTASSIUM CHLORIDE, CALCIUM CHLORIDE 600; 310; 30; 20 MG/100ML; MG/100ML; MG/100ML; MG/100ML
INJECTION, SOLUTION INTRAVENOUS CONTINUOUS
Status: ACTIVE | OUTPATIENT
Start: 2019-09-25 | End: 2019-09-25

## 2019-09-25 RX ORDER — DIPHENHYDRAMINE HCL 25 MG
25 TABLET ORAL EVERY 6 HOURS PRN
Status: DISCONTINUED | OUTPATIENT
Start: 2019-09-25 | End: 2019-09-26 | Stop reason: HOSPADM

## 2019-09-25 RX ORDER — ROCURONIUM BROMIDE 10 MG/ML
INJECTION, SOLUTION INTRAVENOUS PRN
Status: DISCONTINUED | OUTPATIENT
Start: 2019-09-25 | End: 2019-09-25 | Stop reason: SURG

## 2019-09-25 RX ORDER — DIPHENHYDRAMINE HCL 25 MG
25 TABLET ORAL NIGHTLY PRN
Status: DISCONTINUED | OUTPATIENT
Start: 2019-09-26 | End: 2019-09-26 | Stop reason: HOSPADM

## 2019-09-25 RX ORDER — HYDROMORPHONE HYDROCHLORIDE 2 MG/ML
0.1 INJECTION, SOLUTION INTRAMUSCULAR; INTRAVENOUS; SUBCUTANEOUS
Status: DISCONTINUED | OUTPATIENT
Start: 2019-09-25 | End: 2019-09-26 | Stop reason: HOSPADM

## 2019-09-25 RX ORDER — SCOLOPAMINE TRANSDERMAL SYSTEM 1 MG/1
1 PATCH, EXTENDED RELEASE TRANSDERMAL
Status: DISCONTINUED | OUTPATIENT
Start: 2019-09-25 | End: 2019-09-26 | Stop reason: HOSPADM

## 2019-09-25 RX ADMIN — OXYCODONE HYDROCHLORIDE 10 MG: 10 TABLET ORAL at 20:02

## 2019-09-25 RX ADMIN — HYDROMORPHONE HYDROCHLORIDE 0.4 MG: 2 INJECTION INTRAMUSCULAR; INTRAVENOUS; SUBCUTANEOUS at 13:00

## 2019-09-25 RX ADMIN — OXYCODONE HYDROCHLORIDE 10 MG: 5 SOLUTION ORAL at 13:31

## 2019-09-25 RX ADMIN — SUGAMMADEX 200 MG: 100 INJECTION, SOLUTION INTRAVENOUS at 12:50

## 2019-09-25 RX ADMIN — DOCUSATE SODIUM 100 MG: 100 CAPSULE, LIQUID FILLED ORAL at 17:56

## 2019-09-25 RX ADMIN — DEXAMETHASONE SODIUM PHOSPHATE 4 MG: 4 INJECTION, SOLUTION INTRAMUSCULAR; INTRAVENOUS at 11:10

## 2019-09-25 RX ADMIN — HYDROMORPHONE HYDROCHLORIDE 0.4 MG: 2 INJECTION INTRAMUSCULAR; INTRAVENOUS; SUBCUTANEOUS at 13:54

## 2019-09-25 RX ADMIN — ONDANSETRON 4 MG: 2 INJECTION INTRAMUSCULAR; INTRAVENOUS at 12:39

## 2019-09-25 RX ADMIN — GABAPENTIN 200 MG: 100 CAPSULE ORAL at 17:56

## 2019-09-25 RX ADMIN — CEFAZOLIN 2 G: 1 INJECTION, POWDER, FOR SOLUTION INTRAVENOUS at 11:10

## 2019-09-25 RX ADMIN — LIDOCAINE HYDROCHLORIDE 100 MG: 20 INJECTION, SOLUTION EPIDURAL; INFILTRATION; INTRACAUDAL; PERINEURAL at 11:07

## 2019-09-25 RX ADMIN — SENNOSIDES AND DOCUSATE SODIUM 1 TABLET: 8.6; 5 TABLET ORAL at 20:03

## 2019-09-25 RX ADMIN — ACETAMINOPHEN 1000 MG: 500 TABLET, FILM COATED ORAL at 08:45

## 2019-09-25 RX ADMIN — SODIUM CHLORIDE, POTASSIUM CHLORIDE, SODIUM LACTATE AND CALCIUM CHLORIDE: 600; 310; 30; 20 INJECTION, SOLUTION INTRAVENOUS at 12:04

## 2019-09-25 RX ADMIN — LIDOCAINE HYDROCHLORIDE 0.5 ML: 10 INJECTION, SOLUTION INFILTRATION; PERINEURAL at 08:43

## 2019-09-25 RX ADMIN — PHENYLEPHRINE HYDROCHLORIDE 40 MCG/MIN: 10 INJECTION INTRAVENOUS at 11:20

## 2019-09-25 RX ADMIN — TRANEXAMIC ACID 1000 MG: 100 INJECTION, SOLUTION INTRAVENOUS at 13:40

## 2019-09-25 RX ADMIN — SODIUM CHLORIDE, POTASSIUM CHLORIDE, SODIUM LACTATE AND CALCIUM CHLORIDE: 600; 310; 30; 20 INJECTION, SOLUTION INTRAVENOUS at 11:01

## 2019-09-25 RX ADMIN — DEXMEDETOMIDINE HYDROCHLORIDE 60 MCG: 100 INJECTION, SOLUTION INTRAVENOUS at 11:20

## 2019-09-25 RX ADMIN — ROCURONIUM BROMIDE 50 MG: 10 INJECTION, SOLUTION INTRAVENOUS at 11:07

## 2019-09-25 RX ADMIN — HYDROMORPHONE HYDROCHLORIDE 0.4 MG: 2 INJECTION INTRAMUSCULAR; INTRAVENOUS; SUBCUTANEOUS at 13:46

## 2019-09-25 RX ADMIN — VANCOMYCIN HYDROCHLORIDE 1750 MG: 500 INJECTION, POWDER, LYOPHILIZED, FOR SOLUTION INTRAVENOUS at 23:58

## 2019-09-25 RX ADMIN — FENTANYL CITRATE 50 MCG: 50 INJECTION, SOLUTION INTRAMUSCULAR; INTRAVENOUS at 13:42

## 2019-09-25 RX ADMIN — PROPOFOL 50 MG: 10 INJECTION, EMULSION INTRAVENOUS at 12:53

## 2019-09-25 RX ADMIN — OXYCODONE HYDROCHLORIDE 10 MG: 10 TABLET ORAL at 15:28

## 2019-09-25 RX ADMIN — ROCURONIUM BROMIDE 20 MG: 10 INJECTION, SOLUTION INTRAVENOUS at 11:29

## 2019-09-25 RX ADMIN — KETOROLAC TROMETHAMINE 15 MG: 30 INJECTION, SOLUTION INTRAMUSCULAR; INTRAVENOUS at 23:59

## 2019-09-25 RX ADMIN — ASPIRIN 81 MG: 81 TABLET, COATED ORAL at 20:03

## 2019-09-25 RX ADMIN — ACETAMINOPHEN 500 MG: 500 TABLET, FILM COATED ORAL at 17:56

## 2019-09-25 RX ADMIN — SCOPALAMINE 1 PATCH: 1 PATCH, EXTENDED RELEASE TRANSDERMAL at 10:36

## 2019-09-25 RX ADMIN — POTASSIUM CHLORIDE, DEXTROSE MONOHYDRATE AND SODIUM CHLORIDE: 150; 5; 450 INJECTION, SOLUTION INTRAVENOUS at 15:25

## 2019-09-25 RX ADMIN — CEFAZOLIN 2 G: 10 INJECTION, POWDER, FOR SOLUTION INTRAVENOUS; PARENTERAL at 19:00

## 2019-09-25 RX ADMIN — ACETAMINOPHEN 500 MG: 500 TABLET, FILM COATED ORAL at 23:59

## 2019-09-25 RX ADMIN — ROCURONIUM BROMIDE 20 MG: 10 INJECTION, SOLUTION INTRAVENOUS at 11:53

## 2019-09-25 RX ADMIN — KETOROLAC TROMETHAMINE 15 MG: 30 INJECTION, SOLUTION INTRAMUSCULAR; INTRAVENOUS at 17:56

## 2019-09-25 RX ADMIN — HYDROMORPHONE HYDROCHLORIDE 2 MG: 2 INJECTION, SOLUTION INTRAMUSCULAR; INTRAVENOUS; SUBCUTANEOUS at 11:05

## 2019-09-25 RX ADMIN — PROPOFOL 250 MG: 10 INJECTION, EMULSION INTRAVENOUS at 11:06

## 2019-09-25 RX ADMIN — VANCOMYCIN HYDROCHLORIDE 1500 MG: 1 INJECTION, POWDER, LYOPHILIZED, FOR SOLUTION INTRAVENOUS at 10:35

## 2019-09-25 RX ADMIN — FENTANYL CITRATE 50 MCG: 50 INJECTION, SOLUTION INTRAMUSCULAR; INTRAVENOUS at 13:32

## 2019-09-25 RX ADMIN — TRANEXAMIC ACID 1000 MG: 100 INJECTION, SOLUTION INTRAVENOUS at 11:10

## 2019-09-25 RX ADMIN — SODIUM CHLORIDE, POTASSIUM CHLORIDE, SODIUM LACTATE AND CALCIUM CHLORIDE: 600; 310; 30; 20 INJECTION, SOLUTION INTRAVENOUS at 08:44

## 2019-09-25 ASSESSMENT — COGNITIVE AND FUNCTIONAL STATUS - GENERAL
SUGGESTED CMS G CODE MODIFIER MOBILITY: CJ
DAILY ACTIVITIY SCORE: 21
MOBILITY SCORE: 21
CLIMB 3 TO 5 STEPS WITH RAILING: A LITTLE
HELP NEEDED FOR BATHING: A LITTLE
SUGGESTED CMS G CODE MODIFIER DAILY ACTIVITY: CJ
STANDING UP FROM CHAIR USING ARMS: A LITTLE
WALKING IN HOSPITAL ROOM: A LITTLE
TOILETING: A LITTLE
DRESSING REGULAR UPPER BODY CLOTHING: A LITTLE

## 2019-09-25 ASSESSMENT — LIFESTYLE VARIABLES
HAVE PEOPLE ANNOYED YOU BY CRITICIZING YOUR DRINKING: NO
DOES PATIENT WANT TO STOP DRINKING: CANNOT ASSESS
CONSUMPTION TOTAL: NEGATIVE
TOTAL SCORE: 0
EVER HAD A DRINK FIRST THING IN THE MORNING TO STEADY YOUR NERVES TO GET RID OF A HANGOVER: NO
EVER FELT BAD OR GUILTY ABOUT YOUR DRINKING: NO
ON A TYPICAL DAY WHEN YOU DRINK ALCOHOL HOW MANY DRINKS DO YOU HAVE: 0
EVER_SMOKED: NEVER
ALCOHOL_USE: NO
EVER_SMOKED: NEVER
HOW MANY TIMES IN THE PAST YEAR HAVE YOU HAD 5 OR MORE DRINKS IN A DAY: 0
HAVE YOU EVER FELT YOU SHOULD CUT DOWN ON YOUR DRINKING: NO
TOTAL SCORE: 0
TOTAL SCORE: 0
AVERAGE NUMBER OF DAYS PER WEEK YOU HAVE A DRINK CONTAINING ALCOHOL: 0

## 2019-09-25 ASSESSMENT — PATIENT HEALTH QUESTIONNAIRE - PHQ9
2. FEELING DOWN, DEPRESSED, IRRITABLE, OR HOPELESS: NOT AT ALL
1. LITTLE INTEREST OR PLEASURE IN DOING THINGS: NOT AT ALL
SUM OF ALL RESPONSES TO PHQ9 QUESTIONS 1 AND 2: 0

## 2019-09-25 ASSESSMENT — COPD QUESTIONNAIRES
HAVE YOU SMOKED AT LEAST 100 CIGARETTES IN YOUR ENTIRE LIFE: NO/DON'T KNOW
DURING THE PAST 4 WEEKS HOW MUCH DID YOU FEEL SHORT OF BREATH: NONE/LITTLE OF THE TIME
COPD SCREENING SCORE: 1
DO YOU EVER COUGH UP ANY MUCUS OR PHLEGM?: NO/ONLY WITH OCCASIONAL COLDS OR INFECTIONS

## 2019-09-25 ASSESSMENT — PAIN SCALES - GENERAL: PAIN_LEVEL: 5

## 2019-09-25 NOTE — ANESTHESIA PROCEDURE NOTES
Airway  Date/Time: 9/25/2019 11:08 AM  Performed by: Gayathri Contreras M.D.  Authorized by: Gayathri Contreras M.D.     Location:  OR  Urgency:  Elective  Indications for Airway Management:  Anesthesia  Spontaneous Ventilation: absent    Sedation Level:  Deep  Preoxygenated: Yes    Patient Position:  Ramp  Mask Difficulty Assessment:  0 - not attempted  Final Airway Type:  Endotracheal airway  Final Endotracheal Airway:  ETT  Cuffed: Yes    Technique Used for Successful ETT Placement:  Direct laryngoscopy  Devices/Methods Used in Placement:  Intubating stylet  Insertion Site:  Oral  Blade Type:  Odilon  Laryngoscope Blade/Videolaryngoscope Blade Size:  4  ETT Size (mm):  7.5  Leak Pressue (cm H2O):  35  Measured from:  Teeth  ETT to Teeth (cm):  23  Placement Verified by: auscultation and capnometry    Cormack-Lehane Classification:  Grade IIb - view of arytenoids or posterior of glottis only  Number of Attempts at Approach:  1

## 2019-09-25 NOTE — ANESTHESIA PREPROCEDURE EVALUATION
"52yo M here for total hip replacement.    No Known Allergies     Relevant Problems   ANESTHESIA   (+) Obstructive sleep apnea syndrome (no cpap)   (-) History of anesthesia complications      PULMONARY (within normal limits)      NEURO (within normal limits)      CARDIAC (within normal limits)      GI (within normal limits)       (within normal limits)      ENDO   (+) Type 2 diabetes mellitus, without long-term current use of insulin (HCC)      Other   (+) Chronic pain     Past Medical History:   Diagnosis Date   • Inguinal hernia 07/11/2018   • Pain 07/11/2018    \"Both Legs\"   • RA (rheumatoid arthritis) (HCC) 07/11/2018    \"Leg\"   • Umbilical hernia 07/11/2018   • Urinary incontinence 07/11/2018    Urgency in the last two weeks. Pt. states will inform Dr. Myers of this.      Past Surgical History:   Procedure Laterality Date   • INGUINAL HERNIA REPAIR Left 7/18/2018    Procedure: INGUINAL HERNIA REPAIR;  Surgeon: J Carlos Myers M.D.;  Location: SURGERY SAME DAY [facility] ORS;  Service: General   • UMBILICAL HERNIA REPAIR N/A 7/18/2018    Procedure: UMBILICAL HERNIA REPAIR;  Surgeon: J Carlos Myers M.D.;  Location: SURGERY SAME DAY [facility] ORS;  Service: General   • OTHER ORTHOPEDIC SURGERY      Meniscus Knee Surgery Left Knee 2010 & Right Knee 2004      No current facility-administered medications on file prior to encounter.      Current Outpatient Medications on File Prior to Encounter   Medication Sig Dispense Refill   • gabapentin (NEURONTIN) 100 MG Cap Take 200 mg by mouth 2 Times a Day.  1   • acetaminophen (TYLENOL) 500 MG Tab Take 1,000 mg by mouth every day.        Physical Exam    Airway   Mallampati: IV  TM distance: >3 FB  Neck ROM: full       Cardiovascular   Rhythm: regular  Rate: normal  (-) murmur     Dental - normal exam         Pulmonary   Breath sounds clear to auscultation  (-) wheezes     Abdominal   (+) obese     Neurological - normal exam                 Anesthesia Plan    ASA 3 " (AUSTYN no CPAP, prediabetes without treatment, obesity BMI 39, disease in multiple organ systems)   ASA physical status 3 criteria: other (comment)    Plan - general       Airway plan will be ETT        Induction: intravenous    Postoperative Plan: Postoperative administration of opioids is intended.    Pertinent diagnostic labs and testing reviewed    Informed Consent:    Anesthetic plan and risks discussed with patient.

## 2019-09-25 NOTE — OR SURGEON
Immediate Post OP Note    PreOp Diagnosis: left hip AVN with femoral head fracture    PostOp Diagnosis: same    Procedure(s):  ARTHROPLASTY, HIP, TOTAL - Wound Class: Clean  ORIF left femur  Surgeon(s):  Nahid Currie M.D.    Anesthesiologist/Type of Anesthesia:  Anesthesiologist: Gayathri Contreras M.D./General    Surgical Staff:  Circulator: Angelito Farrell R.N.  Limb Mahan: Ruslan Cottrell  Scrub Person: Betzy Douglas  First Assist: Andrea Ramachandran II P.A.-C.    Specimens removed if any:  * No specimens in log *    Estimated Blood Loss: 300 cc    Findings: severe AVN with collapse of femoral head     Complications: intraoperative calcar crack, cabled, patient to remain TDWB x 6 weeks        9/25/2019 12:51 PM Nahid Currie M.D.

## 2019-09-25 NOTE — CARE PLAN
Problem: Safety  Goal: Will remain free from falls  Outcome: PROGRESSING AS EXPECTED  Intervention: Implement fall precautions  Flowsheets  Taken 9/25/2019 1607  Environmental Precautions: Treaded Slipper Socks on Patient;Personal Belongings, Wastebasket, Call Bell etc. in Easy Reach;Report Given to Other Health Care Providers Regarding Fall Risk;Bed in Low Position;Communication Sign for Patients & Families  Taken 9/25/2019 1614  Chair/Bed Strip Alarm: Yes - Alarm On  Note:   Patient educated and understands the fall precautions in place to prevent falls.  Bed alarm is on, IV pole closest to bathroom, treaded slipper socks on, and bedrails closest to bathroom down.  Patient also educated and understands the use of the call button for any assistance with mobility.      Problem: Venous Thromboembolism (VTW)/Deep Vein Thrombosis (DVT) Prevention:  Goal: Patient will participate in Venous Thrombosis (VTE)/Deep Vein Thrombosis (DVT)Prevention Measures  Outcome: PROGRESSING AS EXPECTED  Intervention: Ensure patient wears graduated elastic stockings (SPENCER hose) and/or SCDs, if ordered, when in bed or chair (Remove at least once per shift for skin check)  Flowsheets (Taken 9/25/2019 1609)  Mechanical Prophylaxis : SCDs, Sequential Compression Device  SCDs, Sequential Compression Device: On  Note:   SCDs are worn while patient in bed or chair.  Educated and understands the importance.  Will start ASA tonight.

## 2019-09-25 NOTE — ANESTHESIA TIME REPORT
Anesthesia Start and Stop Event Times     Date Time Event    9/25/2019 1011 Ready for Procedure     1101 Anesthesia Start     1317 Anesthesia Stop        Responsible Staff  09/25/19    Name Role Begin End    Gayathri Contreras M.D. Anesth 1101 1317        Preop Diagnosis (Free Text):  Pre-op Diagnosis     UNILATERAL PRIMARY OSTEOARTHRITIS LEFT HIP        Preop Diagnosis (Codes):    Post op Diagnosis  OA (osteoarthritis) of hip      Premium Reason  Non-Premium    Comments:

## 2019-09-25 NOTE — ANESTHESIA POSTPROCEDURE EVALUATION
Patient: Yoni Conklin    Procedure Summary     Date:  09/25/19 Room / Location:   OR  / SURGERY [facility]    Anesthesia Start:  1101 Anesthesia Stop:  1317    Procedure:  ARTHROPLASTY, HIP, TOTAL (Left Hip) Diagnosis:  (UNILATERAL PRIMARY OSTEOARTHRITIS LEFT HIP)    Surgeon:  Nahid Currie M.D. Responsible Provider:  Gayathri Contreras M.D.    Anesthesia Type:  general ASA Status:  3          Final Anesthesia Type: general  Last vitals  BP   Blood Pressure: 121/61    Temp   36.6 °C (97.9 °F)    Pulse   Pulse: 82, Heart Rate (Monitored): 70   Resp   16    SpO2   99 %      Anesthesia Post Evaluation    Patient location during evaluation: PACU  Patient participation: complete - patient participated  Level of consciousness: awake and alert  Pain score: 5    Airway patency: patent  Anesthetic complications: no  Cardiovascular status: hemodynamically stable  Respiratory status: acceptable  Hydration status: euvolemic    PONV: none

## 2019-09-25 NOTE — OP REPORT
DATE OF SERVICE:  09/25/2019    PREOPERATIVE DIAGNOSIS:  Left hip avascular necrosis with femoral head   collapse and fracture.    POSTOPERATIVE DIAGNOSIS:  Left hip avascular necrosis with femoral head   collapse and fracture.    PROCEDURE PERFORMED:  1.  Left total hip arthroplasty.  2.  Open reduction and internal fixation of left femur.    SURGEON:  Nahid Currie MD    ANESTHESIOLOGIST:  Gayathri Contreras MD    ASSISTANT:  Andrea Ramachandran PA-C    ESTIMATED BLOOD LOSS:  300 mL    FINDINGS:  Include severe AVN with collapse of femoral head, contracted left   hip capsule and an intraoperative calcar crack, which was cabled with cerclage   cables.    IMPLANTS USED:  HubHuman dual mobility total hip arthroplasty system with the   following components:  1.  A Trident Tritanium hemispherical shell size 56, alpha code E.  2.  An Accolade II femoral stem size 6 with a 132-degree neck angle.  3.  MDM dual mobility head system with a 28 mm +0 Biolox delta ceramic inner   head and a 28x48 mm X3 polyethylene outer head.  4.  Two acetabular bone screws were placed for adjunctive fixation.  5.  The appropriate MDM cementless liner was used, alpha code E.  6.  Two 2.0 mm Dall-Miles cables were placed distal to the lesser trochanter   for intraoperative cerclage fixation of his femur.    INDICATIONS FOR PROCEDURE:  This patient is a 53-year-old gentleman with a   longstanding history of left hip AVN.  He had failed conservative management   and was being disabled by his left hip symptoms.  He had failed conservative   management including anti-inflammatory medications, steroid injections and   physical therapy.  He was indicated for a left total hip arthroplasty.  The   risks, benefits and alternatives were explained to him including the risk of   pain, bleeding, infection, need for further surgery, lack of healing,   fractures, dislocations, damage to surrounding structures, heart attack,   stroke, and death.  He did express  an understanding these are manmade implants   with limited life spans.  He was cleared by the medical physicians and taken   to the operating room on the day of surgery for the above named procedure.    All informed consent and information over the risks, benefits, and   alternatives were performed with a .    DESCRIPTION OF PROCEDURE:  The patient was met in the preoperative holding   area.  The left hip was marked as the appropriate operative site with   indelible ink.  He was taken to the operating room where the anesthesia   department started a general anesthetic for intraoperative and postoperative   pain relief.  He was placed on the OR table.  He was turned on his side with   his left hip facing up.  All bony prominences were padded appropriately.  His   left hip was prepped and draped in a standard sterile surgical fashion.  A   time-out procedure was called to verify the side and site of surgery as well   as administration of preoperative antibiotics.  After successful completion of   time-out procedure, attention was turned to the left hip.  A straight   incision was made centered on his greater trochanter.  This was carried down   through the subcutaneous tissue with the assistance of the Bovie   electrocautery.  The fascia was identified and cleared and then incised in   line with its fibers.  A Charnley retractor was placed.  The hip was   internally rotated.  He was noted to have virtually no internal rotation and   an abduction contracture.  Upon removal of his external rotators, we did note   some improvement in his range of motion.  The posterior capsule was able to be   identified and cleared and then incised in a T capsulotomy.  The hip was able   to be dislocated.  We noted fragmentation of his femoral head into several   large pieces.  The femoral neck was able to be cleared and then an osteotomy   was made with the saw.  The femoral neck was removed.  We elevated the femur    out of the wound.  We did note a crack within his calcar that extended to the   level of the lesser trochanter.  This was noted.  A decision was made at this   point to proceed with cabling of his femur; however, we did begin first with a   box osteotome and then with a canal finder followed by the lateralizing   reamer.  We broached up to a size 6, at which point we noted appropriate   calcar fit.  There was not much distraction of the calcar crack.  However, his   anterior femoral component did appear to be somewhat mobile.  We left the   broach in place and retracted the femur anteriorly.  We exposed the acetabulum   in a 360-degree fashion.  This required fairly extensive soft tissue releases   and removal of osteophytes from the inferior rim.  Again, several large   fragments of his femoral head were removed from the acetabular bed at this   point.  At this point, we were able to ream in the medial direction and then   in the proper amount of abduction and anteversion up to a size 55 at which   point we noted excellent bleeding bony bed for implantation.  At this point,   the wound was thoroughly irrigated and a 56 mm acetabular component was   impacted into place in the proper amount of abduction and anteversion.  Two   acetabular bone screws were placed for adjunctive fixation.  The MDM   cementless liner was impacted into place and tested with the removal tool to   ensure proper seating.  At this point, we constructed a trial hip and reduced   the hip and we took an intraoperative x-ray to determine the extent of the   calcar crack.  We did note that it stopped at the level of the lesser   trochanter.  We did place 2 Dall-Miles cables distal to the lesser trochanter.    These were passed, tightened, crimped, and cut in the standard fashion.  We   then removed the broach and placed the size 6x132 Accolade II femoral stem   into the femoral canal, which had been thoroughly irrigated.  Another trial    reduction was made with +0 head and again we noted excellent restoration of   leg length, offset and stability.  Therefore, the real MDM dual mobility head   system was opened in a sterile fashion on the back table and assembled.  This   was impacted onto the taper, which had been cleaned and dried thoroughly.  At   this point, the hip was reduced and we did take an additional intraoperative   x-ray again to ensure placement of our cables below the crack within his   lesser trochanter.  At this point, we thoroughly irrigated the wound.  A   dilute Betadine solution was instilled for 3 minutes before being pulse   lavaged out.  The remnants of the posterior capsule were closed with #5   Ethibond.  The fascial layer was closed with a #2 Quill.  The deep   subcutaneous layer was closed with a running #1 PDS.  The deep dermal layer   was closed with 2-0 Vicryl in buried interrupted fashion.  The skin was closed   with staples and a sterile Prevena negative pressure wound management system   was placed and is holding excellent suction at the time of this dictation.    The patient is currently in the operating room awaiting reversal of   anesthesia.  He will be touchdown weightbearing with strict posterior hip   precautions.  He will use a walker or crutches for ambulation purposes.       ____________________________________     MD AUDREY Orellana / SURYA    DD:  09/25/2019 13:01:15  DT:  09/25/2019 15:28:15    D#:  [id number]  Job#:  [id number]

## 2019-09-26 VITALS
HEART RATE: 52 BPM | SYSTOLIC BLOOD PRESSURE: 108 MMHG | OXYGEN SATURATION: 100 % | HEIGHT: 66 IN | TEMPERATURE: 98.3 F | BODY MASS INDEX: 39.15 KG/M2 | WEIGHT: 243.61 LBS | DIASTOLIC BLOOD PRESSURE: 61 MMHG | RESPIRATION RATE: 18 BRPM

## 2019-09-26 PROBLEM — S72.059A FRACTURE OF FEMORAL HEAD (HCC): Status: ACTIVE | Noted: 2019-09-26

## 2019-09-26 PROBLEM — M87.052 ASEPTIC NECROSIS OF BONE OF LEFT HIP (HCC): Status: ACTIVE | Noted: 2019-09-26

## 2019-09-26 LAB
BACTERIA FLD AEROBE CULT: NORMAL
GRAM STN SPEC: NORMAL
HCT VFR BLD AUTO: 35.2 % (ref 42–52)
HGB BLD-MCNC: 11.9 G/DL (ref 14–18)
SIGNIFICANT IND 70042: NORMAL
SITE SITE: NORMAL
SOURCE SOURCE: NORMAL

## 2019-09-26 PROCEDURE — 85018 HEMOGLOBIN: CPT

## 2019-09-26 PROCEDURE — A9270 NON-COVERED ITEM OR SERVICE: HCPCS | Performed by: PHYSICIAN ASSISTANT

## 2019-09-26 PROCEDURE — 36415 COLL VENOUS BLD VENIPUNCTURE: CPT

## 2019-09-26 PROCEDURE — 302252 SYSTEM PREVENA CANISTER 45ML DISP VAC: Performed by: ORTHOPAEDIC SURGERY

## 2019-09-26 PROCEDURE — 700111 HCHG RX REV CODE 636 W/ 250 OVERRIDE (IP): Performed by: PHYSICIAN ASSISTANT

## 2019-09-26 PROCEDURE — 700105 HCHG RX REV CODE 258: Performed by: PHYSICIAN ASSISTANT

## 2019-09-26 PROCEDURE — 97535 SELF CARE MNGMENT TRAINING: CPT

## 2019-09-26 PROCEDURE — 700102 HCHG RX REV CODE 250 W/ 637 OVERRIDE(OP): Performed by: PHYSICIAN ASSISTANT

## 2019-09-26 PROCEDURE — 85014 HEMATOCRIT: CPT

## 2019-09-26 PROCEDURE — 97165 OT EVAL LOW COMPLEX 30 MIN: CPT

## 2019-09-26 PROCEDURE — 700112 HCHG RX REV CODE 229: Performed by: PHYSICIAN ASSISTANT

## 2019-09-26 PROCEDURE — 97162 PT EVAL MOD COMPLEX 30 MIN: CPT

## 2019-09-26 PROCEDURE — 97116 GAIT TRAINING THERAPY: CPT

## 2019-09-26 RX ORDER — OXYCODONE HYDROCHLORIDE 5 MG/1
5 TABLET ORAL EVERY 6 HOURS PRN
Qty: 40 TAB | Refills: 0 | Status: SHIPPED | OUTPATIENT
Start: 2019-09-26 | End: 2019-10-06

## 2019-09-26 RX ORDER — ONDANSETRON 4 MG/1
4 TABLET, ORALLY DISINTEGRATING ORAL EVERY 4 HOURS PRN
Qty: 10 TAB | Refills: 0 | Status: SHIPPED | OUTPATIENT
Start: 2019-09-26 | End: 2023-02-08

## 2019-09-26 RX ORDER — PSEUDOEPHEDRINE HCL 30 MG
100 TABLET ORAL 2 TIMES DAILY
Qty: 60 CAP | Refills: 0 | Status: SHIPPED | OUTPATIENT
Start: 2019-09-26 | End: 2023-02-08

## 2019-09-26 RX ORDER — ASPIRIN 81 MG/1
81 TABLET ORAL 2 TIMES DAILY
Qty: 30 TAB | Refills: 0 | Status: SHIPPED | OUTPATIENT
Start: 2019-09-26 | End: 2023-02-08

## 2019-09-26 RX ORDER — TRAMADOL HYDROCHLORIDE 50 MG/1
50 TABLET ORAL EVERY 6 HOURS PRN
Qty: 40 TAB | Refills: 0
Start: 2019-09-26 | End: 2019-10-06

## 2019-09-26 RX ADMIN — ACETAMINOPHEN 500 MG: 500 TABLET, FILM COATED ORAL at 06:00

## 2019-09-26 RX ADMIN — DOCUSATE SODIUM 100 MG: 100 CAPSULE, LIQUID FILLED ORAL at 06:00

## 2019-09-26 RX ADMIN — GABAPENTIN 200 MG: 100 CAPSULE ORAL at 06:00

## 2019-09-26 RX ADMIN — CEFAZOLIN 2 G: 10 INJECTION, POWDER, FOR SOLUTION INTRAVENOUS; PARENTERAL at 04:27

## 2019-09-26 RX ADMIN — ASPIRIN 81 MG: 81 TABLET, COATED ORAL at 06:00

## 2019-09-26 RX ADMIN — DEXAMETHASONE SODIUM PHOSPHATE 8 MG: 4 INJECTION, SOLUTION INTRAMUSCULAR; INTRAVENOUS at 06:01

## 2019-09-26 RX ADMIN — KETOROLAC TROMETHAMINE 15 MG: 30 INJECTION, SOLUTION INTRAMUSCULAR; INTRAVENOUS at 06:01

## 2019-09-26 ASSESSMENT — COGNITIVE AND FUNCTIONAL STATUS - GENERAL
STANDING UP FROM CHAIR USING ARMS: A LITTLE
TURNING FROM BACK TO SIDE WHILE IN FLAT BAD: A LITTLE
MOVING TO AND FROM BED TO CHAIR: A LITTLE
MOBILITY SCORE: 17
WALKING IN HOSPITAL ROOM: A LITTLE
SUGGESTED CMS G CODE MODIFIER MOBILITY: CK
CLIMB 3 TO 5 STEPS WITH RAILING: A LITTLE
MOVING FROM LYING ON BACK TO SITTING ON SIDE OF FLAT BED: A LOT

## 2019-09-26 ASSESSMENT — GAIT ASSESSMENTS
DEVIATION: ANTALGIC;OTHER (COMMENT)
GAIT LEVEL OF ASSIST: MINIMAL ASSIST
ASSISTIVE DEVICE: FRONT WHEEL WALKER
DISTANCE (FEET): 85

## 2019-09-26 ASSESSMENT — ACTIVITIES OF DAILY LIVING (ADL): TOILETING: INDEPENDENT

## 2019-09-26 NOTE — CARE PLAN
Problem: Venous Thromboembolism (VTW)/Deep Vein Thrombosis (DVT) Prevention:  Goal: Patient will participate in Venous Thrombosis (VTE)/Deep Vein Thrombosis (DVT)Prevention Measures  Outcome: PROGRESSING AS EXPECTED     Problem: Knowledge Deficit  Goal: Knowledge of the prescribed therapeutic regimen will improve  Outcome: PROGRESSING AS EXPECTED     Problem: Pain Management  Goal: Pain level will decrease to patient's comfort goal  Outcome: PROGRESSING AS EXPECTED

## 2019-09-26 NOTE — PROGRESS NOTES
MD in to see pt; d/c orders received. IV dc'd. Pt changed into clothing with assistance. Discharge instructions given; pt and family verbalized understanding and questions answered. Pt dc'd in w/c with hospital escort at 1215.

## 2019-09-26 NOTE — THERAPY
"Occupational Therapy Evaluation completed.   Functional Status:  52 yo male admit for L SAMANTHA, posterior precautions, TTWB.  Pt will have assist from spouse full time, adult son also avail to help frequently.  Family translating per pt wishes.  Pt required Jose for supine to sit EOB, Jose sit to stand up from EOB.  Jose LB dressing with AE for hip precuations. Pt and family educated on use of the BSC they have at home to assist with toilet transfers and to use as a shower chair to maintain TTWB for bathing.  Pt appears to have good understanding of hip precautions and is very comfortable using AE.  He is safe to d/c home with family assist. Educated pt and family on role of OT and Posterior Total Hip Precautions with ADL's. Reviewed application of precautions and use of Adaptive Equipment for dressing, bathing, toileting, grooming, kitchen activities and general functional mobility in the home. Reviewed the use of reacher, sockaide, long handled shoe horn and long handled sponge, as well as shower chair and raised toilet seat to assist with ADL's.  Reviewed stall shower transfers. Provided pt with ADL equipment handout and suggestions on where to obtain needed items.  Educated on mgmt of Prevena Vac during ADL's.   Educated on covering incision with plastic wrap and skin tape for protection when showering, and around the clock pain management schedule to prevent pain crisis.  Pt and family verbalized understanding of all education provided.    Plan of Care: Patient with no further skilled OT needs in the acute care setting at this time  Discharge Recommendations:  Equipment: hip kit, FWW. Post-acute therapy Discharge to home with outpatient or home health for additional skilled therapy services.    See \"Rehab Therapy-Acute\" Patient Summary Report for complete documentation.    "

## 2019-09-26 NOTE — PROGRESS NOTES
S:  s/p left SAMANTHA and ORIF femur  Pain controlled  No N/V  No Chest Pain/SOB  Afebrile  Exam:    NAD A& O x3    RLE: +DF/PF/EHL SILT L4/L5/S1  LLE:  +DF/PF/EHL SILT L4/L5/S1    Plan:  Continue standard plan of care  Weight bearing: TDWB with walker and posterior hip precautions  DVT prophylaxis: SCD/Teds + ASA  Dispo: home today  Plan explained to patient with family member acting as  per patient's request

## 2019-09-26 NOTE — DISCHARGE SUMMARY
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 Nahid Currie on the date of admission. Please see dictated operative note for further information.    Hospital course: standard    The patient has done well, with no complications.  Patient denies chest pain, calf pain or shortness of breath.   Pain is well-controlled at present.  Patient is ambulating well with the use of an assistive device, and progressing in physical therapy.   Patient is neurologically and vascularly intact with palpable pedal pulses bilaterally.   Narcotic dosages were chosen by taking into account the the patient's previous history of opoid use and to ensure proper pain control after surgery    Discharge date: 9-26-19    Patient is being discharged to home after am physical therapy.     Allergies:  Patient has no known allergies.       Medication List      START taking these medications      Instructions   aspirin 81 MG EC tablet   Take 1 Tab by mouth 2 Times a Day.  Dose:  81 mg     docusate sodium 100 MG Caps   Take 100 mg by mouth 2 Times a Day.  Dose:  100 mg     ondansetron 4 MG Tbdp  Commonly known as:  ZOFRAN ODT   Take 1 Tab by mouth every four hours as needed for Nausea.  Dose:  4 mg     oxyCODONE immediate-release 5 MG Tabs  Commonly known as:  ROXICODONE   Take 1 Tab by mouth every 6 hours as needed for Severe Pain for up to 10 days.  Dose:  5 mg     tramadol 50 MG Tabs  Commonly known as:  ULTRAM   Take 1 Tab by mouth every 6 hours as needed (Moderate Pain (NRS Pain Scale 4-6; CPOT Pain Scale 3-5) if opiates not ordered or tolerated) for up to 10 days.  Dose:  50 mg        CONTINUE taking these medications      Instructions   acetaminophen 500 MG Tabs  Commonly known as:  TYLENOL   Take 1,000 mg by mouth every day.  Dose:  1,000 mg      gabapentin 100 MG Caps  Commonly known as:  NEURONTIN   Take 200 mg by mouth 2 Times a Day.  Dose:  200 mg            Discharge Instructions:     Patient is instructed to ambulate and remain touchdown weight bearing with the use of an assistive device, and to continue physical therapy exercises given during this hospital stay. Strict posterior hip precautions are to be observed for patients who underwent a total hip replacement.   Patient is to ice and elevate the surgical leg regularly, with pillows under the ankle, nothing is to be placed under the knee.   Patient was given detailed wound care instructions, and will leave the Incisional vac or silver dressing on until first post-op visit.   ASA twice daily for DVT prophylaxis.  Patient is to follow up with Dr. Currie's office in 1-2 weeks.  All instructions were given in Somali with family member who patient requested to use as an

## 2019-09-26 NOTE — DISCHARGE PLANNING
DISCHARGE PLANNING NOTE - TOTAL JOINT     Procedure: Procedure(s):  ARTHROPLASTY, HIP, TOTAL - Wound Class: Clean  Procedure Date: 9/25/2019  Insurance:  Payor: [employer] / Plan: [employer] MYHPN / Product Type: *No Product type* /   Equipment currently available at home? cane  Steps into the home? 2  Steps within the home? 1  Toilet height? Standard  Type of shower? walk-in shower  Who will be with you during your recovery? Spouse-Yvonne  Is Outpatient Physical Therapy set up after surgery? No   Did you take the Total Joint Class and where? Yes, at [facility]     Plan: Met with patient and spouse with video  during preadmission appointment.  Reviewed Equipment Resource list and provided a copy to the patient.  Provided and reviewed Home Safety Checklist.  Quiet Hours information given and reviewed.  There are no identified discharge needs at this time.  Anticipate discharge home with family.  Patient reviewed and signed choice form, which is scanned to  tab.

## 2019-09-26 NOTE — PROGRESS NOTES
Discussed TTWB and hip precautions with pt and son at bedside. Pt provided with extra Prevena canister, and Prevena pamphlet.

## 2019-09-26 NOTE — DISCHARGE INSTRUCTIONS
Dr. Currie's Discharge Instructions:  The first week after your joint replacement is a time to recover from the surgery. We expect light exercise to keep you active and mobile. Dr. Currie requires a walker or cane for most patients in the first few days following surgery to try to prevent falls or complications.     Most patients are prescribed two medications for pain control: a narcotic such as Oxycodone or Hydrocodone and a milder medication called Tramadol. These can be alternated for pain control, and the priority is to decrease use of the stronger narcotic as soon as tolerated.   Take your pain medication as appropriate to ensure that your pain is not limiting your recovery. You'll be seen in clinic in 7-10 days (this appointment has already been made, call our office if you're unsure of the time). At that time, we'll prescribe your physical therapy to help with the recovery phase.     -Keep dressing clean and dry. Leave dressing in place until follow up. If you have an incisional vac dressing, the battery may die before your first post operative appointment, but you can leave the surgical dressing in place and it will be removed at your clinic visit. The battery of the dressing may die, and the sponge will inflate because it is no longer holding suction. You can still leave the dressing in place and it will be removed at the office. Call the office if you notice drainage from the surgical dressing.    -OK to shower, keep dressing in place. Pat dressing dry, do not rub incision    -Do not soak incision in bath, hot tub or pool    -Follow up with Dr. Currie at regularly scheduled time    -Call [facility] office at [phone] for questions    -Weight bearing status: touch down weight bearing with posterior hip precautions    -Take medication as prescribed for DVT prophylaxis      Discharge Instructions    Discharged to home by car with relative. Discharged via wheelchair, hospital escort: Yes.  Special equipment  needed: Not Applicable    Be sure to schedule a follow-up appointment with your primary care doctor or any specialists as instructed.     Discharge Plan:   Influenza Vaccine Indication: Patient Refuses    I understand that a diet low in cholesterol, fat, and sodium is recommended for good health. Unless I have been given specific instructions below for another diet, I accept this instruction as my diet prescription.   Other diet: n/a    Special Instructions: Discharge instructions for the Orthopedic Patient    Follow up with Primary Care Physician within 2 weeks of discharge to home, regarding:  Review of medications and diagnostic testing.  Surveillance for medical complications.  Workup and treatment of osteoporosis, if appropriate.   Total Joint Hip Replacement Discharge Instructions    Pain  - The goal is to slowly wean off the prescription pain medicine.  - Ice can be used for pain control.  20 minutes at a time is recommended, and never directly against your skin or incision.  - Most patients are off the pain pills by 3 weeks; others may require a low level of pain medications for many months. If your pain continues to be severe, follow up with your physician.  Infection  Deep hip joint infections that require removal of the prostheses occur in less than 0.1% of patients. Lesser infections in the skin (cellulites) are more common and much more easily treated.  - Keep the incision as clean and dry as possible.  - Always wash your hands before touching your incision.  - Skin infections tend to develop around 7-10 days after surgery, most can be treated with oral antibiotics.  - Dental Care should be delayed for 3 months after surgery, your surgeon recommends taking a dose of antibiotics 1 hour prior to any dental procedure.  After 2 years, most surgeons recommend antibiotics only before an extensive procedure.  Ask your surgeon what he recommends.  - Signs and symptoms of infection can include:  low grade fever,  redness, pain, swelling and drainage from your incision.  Notify your surgeon immediately if you develop any of these symptoms.  Post op Disturbances  - Bowel habits - constipation is extremely common and is caused by a combination of anesthesia, lack of mobility and pain medicine.  Use stool softeners or laxatives if necessary. It is important not to ignore this problem, as bowel obstructions can be a serious complication after joint replacement surgery.  - Mood/Energy Level - Many patients experience a lack of energy and endurance for up to 2-3 months after surgery.  Some may also feel down and can even become depressed.  This is likely due to the postoperative anemia, change in activity level, lack of sleep, pain medicine and just the emotional reaction to the surgery itself that is a big disruption in a person’s life.  This usually passes.  If symptoms persist, follow up with your primary physician.  - Returning to work - Your surgeon will give you more specific instructions.  Generally, if you work a sedentary job requiring little standing or walking, most patients may return within 2-6 weeks.  Manual labor jobs involving walking, lifting and standing may take 3-4 months.  Your surgeon’s office can provide a release to part-time or light duty work early on in your recovery and progress you to full duty as able.  - Driving - You can begin driving an automatic shift car in 4 to 8 weeks, provided you are no longer taking narcotic pain medication. If you have a stick-shift car and your right hip was replaced, do not begin driving until your doctor says you can.   - Avoiding falls -  throw rugs and tack down loose carpeting.  Be aware of floor hazards such as pets, small objects or uneven surfaces.   -  Airport Metal Detectors - The sensitivity of metal detectors varies and it is likely that your prosthesis will cause an alarm. Inform the  that you have an artificial joint.  Diet  - Resume  your normal diet as tolerated.  - It is important to achieve a healthy nutritional status by eating a well balanced diet on a regular basis.  - Your physician may recommend that you take iron and vitamin supplements.   - Continue to drink plenty of fluids.  Shower/Bathing  - You may shower as soon as you get home from the hospital unless otherwise instructed.  - Keep your incision out of water.  To keep the incision dry when showering, cover it with a plastic bag or plastic wrap.  - Pat incision dry if it gets wet.  Don’t rub.  - Do not submerge in a bath until staples are out and the incision is completely healed. (Approximately 6-8 weeks after surgery).  Dressing Change:  Procedure (if recommended by your physician)  - Wash hands.  - Open all dressing change materials.  - Remove old dressing and discard.  - Inspect incision for redness, increase in clear drainage, yellow/green drainage, odor and surrounding skin hot to touch.  -  ABD (large gauze) pad by one corner and lay over the incision.  Be careful not to touch the inside of the dressing that will lay over the incision.  - Secure in place as instructed (Ace wrap or tape).    Swelling/Bruising  - Swelling is normal after hip replacement and can involve the thigh, knee, calf and foot.  - Swelling can last from 3-6 months.  - Elevate your leg higher than your heart while reclining.  The first week you are home you should elevate your leg an equal amount of time, as you are active.    - Anti-inflammatory pills can be taken once you have stopped the blood thinners.  - The swelling is usually worse after you go home since you are upright for longer periods of time.  - Bruising is common and can involve the entire leg including the thigh, calf and even foot.  Bruising often does not appear until after you arrive home and it can be quite dramatic- purple, black, green.  The bruising you can see is not usually concerning and will subside without any  treatment.      Blood Clot Prevention  Blood clots in the legs and the less common, but frightening, clots that travel to the lungs are a real focus of our preventative. Most patients are at standard risk for them, but those patients who are at higher risk include people who have had previous clots, a family history of clotting, smoking, diabetes, obesity, advanced age, use of estrogen and a sedentary lifestyle.    - Signs of blood clots in legs - Swelling in thigh, calf or ankle that does not go down with elevation.  Pain, heat and tenderness in calf, back of calf or groin area.  NOTE: blood clots can occur in either leg.  - You have been receiving anticoagulant therapy (blood thinners) in the hospital and you may be instructed to continue at home depending on your risk factors.  - Your risk for developing a clot continues for up to 2-3 months after surgery.  You should avoid prolonged sitting and dehydration during that time (long air trips and car trips).  If you do take a trip during this time, please get up and move around every 1- 1.5 hours.  - If you are prescribed blood thinning medication for home, follow instructions as directed. (Handouts provided if applicable).      Activity    Once you get home, you should stay active. The key is not to overdo it! While you can expect some good days and some bad days, you should notice a gradual improvement over time you should notice a gradual improvement and a gradual increase in your endurance over the next 6 to 12 months.    - Weight Bearing - If you have undergone cemented or hybrid hip replacement, you can put some weight on the leg immediately using a cane or walker, and you should continue to use some support for 4 to 6 weeks to help the muscles recover.   - Sleeping Positions - Sleep on your back with your legs slightly apart or on your side with a regular pillow between your knees. Be sure to use the pillow for at least 6 weeks, or until your doctor says you  can do without it. Sleeping on your stomach should be all right  - Sitting - For at least the first 3 months, sit only in chairs that have arms. Do not sit on low chairs, low stools, or reclining chairs. Do not cross your legs at the knees. The physical therapist will show you how to sit and stand from a chair, keeping your affected leg out in front of you. Get up and move around on a regular basis--at least once every hour.  - Walking - Walk as much as you like once your doctor gives you the go-ahead, but remember that walking is no substitute for your prescribed exercises. Walking with a pair of trekking poles is helpful and adds as much as 40% to the exercise you get when you walk  - Therapy may be needed in some cases, to strengthen your muscles and improve your gait (walking pattern).  This decision will be made at your post-operative appointment.  Follow your therapist recommended post-operative exercises (handout provided by Therapist).  - Swimming is also recommended; you can begin as soon as the sutures have been removed and the wound is healed, approximately 6 to 8 weeks after surgery. Using a pair of training fins may make swimming a more enjoyable and effective exercise.  - Other activities - Lower impact activities are preferred.  If you have specific questions, consult your Surgeon.    - Sexual activity - Your surgeon can tell you when it should be safe to resume sexual activity.      When to Call the Doctor   Call the physician if:   - Fever over 100.5? F  - Increased pain, drainage, redness, odor or heat around the incision area  - Shaking chills  - Increased knee pain with activity and rest  - Increased pain in calf, tenderness or redness above or below the knee  - Increased swelling of calf, ankle, foot  - Sudden increased shortness of breath, sudden onset of chest pain, localized chest pain with coughing  - Incision opening  Or, if there are any questions or concerns about medications or care.        -Is this a Joint Replacement patient? Yes     -Is this patient being discharged with medication to prevent blood clots?  Yes, Aspirin Aspirin, ASA oral tablets  What is this medicine?  ASPIRIN (AS pir in) is a pain reliever. It is used to treat mild pain and fever. This medicine is also used as directed by a doctor to prevent and to treat heart attacks, to prevent strokes, and to treat arthritis or inflammation.  This medicine may be used for other purposes; ask your health care provider or pharmacist if you have questions.  COMMON BRAND NAME(S): Aspir-Low, Aspir-Sandy, Aspirtab, Kaiser Advanced Aspirin, Kaiser Aspirin, Kaiser Aspirin Extra Strength, Kaiser Aspirin Plus, Kaiser Extra Strength, Kaiser Extra Strength Plus, Kaiser Genuine Aspirin, Kaiser Womens Aspirin, Bufferin, Bufferin Extra Strength, Bufferin Low Dose  What should I tell my health care provider before I take this medicine?  They need to know if you have any of these conditions:  -anemia  -asthma  -bleeding problems  -child with chickenpox, the flu, or other viral infection  -diabetes  -gout  -if you frequently drink alcohol containing drinks  -kidney disease  -liver disease  -low level of vitamin K  -lupus  -smoke tobacco  -stomach ulcers or other problems  -an unusual or allergic reaction to aspirin, tartrazine dye, other medicines, dyes, or preservatives  -pregnant or trying to get pregnant  -breast-feeding  How should I use this medicine?  Take this medicine by mouth with a glass of water. Follow the directions on the package or prescription label. You can take this medicine with or without food. If it upsets your stomach, take it with food. Do not take your medicine more often than directed.  Talk to your pediatrician regarding the use of this medicine in children. While this drug may be prescribed for children as young as 12 years of age for selected conditions, precautions do apply. Children and teenagers should not use this medicine to treat  chicken pox or flu symptoms unless directed by a doctor.  Patients over 65 years old may have a stronger reaction and need a smaller dose.  Overdosage: If you think you have taken too much of this medicine contact a poison control center or emergency room at once.  NOTE: This medicine is only for you. Do not share this medicine with others.  What if I miss a dose?  If you are taking this medicine on a regular schedule and miss a dose, take it as soon as you can. If it is almost time for your next dose, take only that dose. Do not take double or extra doses.  What may interact with this medicine?  Do not take this medicine with any of the following medications:  -cidofovir  -ketorolac  -probenecid  This medicine may also interact with the following medications:  -alcohol  -alendronate  -bismuth subsalicylate  -flavocoxid  -herbal supplements like feverfew, garlic, kuldip, ginkgo biloba, horse chestnut  -medicines for diabetes or glaucoma like acetazolamide, methazolamide  -medicines for gout  -medicines that treat or prevent blood clots like enoxaparin, heparin, ticlopidine, warfarin  -other aspirin and aspirin-like medicines  -NSAIDs, medicines for pain and inflammation, like ibuprofen or naproxen  -pemetrexed  -sulfinpyrazone  -varicella live vaccine  This list may not describe all possible interactions. Give your health care provider a list of all the medicines, herbs, non-prescription drugs, or dietary supplements you use. Also tell them if you smoke, drink alcohol, or use illegal drugs. Some items may interact with your medicine.  What should I watch for while using this medicine?  If you are treating yourself for pain, tell your doctor or health care professional if the pain lasts more than 10 days, if it gets worse, or if there is a new or different kind of pain. Tell your doctor if you see redness or swelling. Also, check with your doctor if you have a fever that lasts for more than 3 days. Only take this  medicine to prevent heart attacks or blood clotting if prescribed by your doctor or health care professional.  Do not take aspirin or aspirin-like medicines with this medicine. Too much aspirin can be dangerous. Always read the labels carefully.  This medicine can irritate your stomach or cause bleeding problems. Do not smoke cigarettes or drink alcohol while taking this medicine. Do not lie down for 30 minutes after taking this medicine to prevent irritation to your throat.  If you are scheduled for any medical or dental procedure, tell your healthcare provider that you are taking this medicine. You may need to stop taking this medicine before the procedure.  This medicine may be used to treat migraines. If you take migraine medicines for 10 or more days a month, your migraines may get worse. Keep a diary of headache days and medicine use. Contact your healthcare professional if your migraine attacks occur more frequently.  What side effects may I notice from receiving this medicine?  Side effects that you should report to your doctor or health care professional as soon as possible:  -allergic reactions like skin rash, itching or hives, swelling of the face, lips, or tongue  -breathing problems  -changes in hearing, ringing in the ears  -confusion  -general ill feeling or flu-like symptoms  -pain on swallowing  -redness, blistering, peeling or loosening of the skin, including inside the mouth or nose  -signs and symptoms of bleeding such as bloody or black, tarry stools; red or dark-brown urine; spitting up blood or brown material that looks like coffee grounds; red spots on the skin; unusual bruising or bleeding from the eye, gums, or nose  -trouble passing urine or change in the amount of urine  -unusually weak or tired  -yellowing of the eyes or skin  Side effects that usually do not require medical attention (report to your doctor or health care professional if they continue or are bothersome):  -diarrhea or  constipation  -headache  -nausea, vomiting  -stomach gas, heartburn  This list may not describe all possible side effects. Call your doctor for medical advice about side effects. You may report side effects to FDA at 9-657-QVR-6827.  Where should I keep my medicine?  Keep out of the reach of children.  Store at room temperature between 15 and 30 degrees C (59 and 86 degrees F). Protect from heat and moisture. Do not use this medicine if it has a strong vinegar smell. Throw away any unused medicine after the expiration date.  NOTE: This sheet is a summary. It may not cover all possible information. If you have questions about this medicine, talk to your doctor, pharmacist, or health care provider.  © 2018 [employer]/Gold Standard (2014-08-19 11:30:31)      · Is patient discharged on Warfarin / Coumadin?   No     Depression / Suicide Risk    As you are discharged from this [facility] facility, it is important to learn how to keep safe from harming yourself.    Recognize the warning signs:  · Abrupt changes in personality, positive or negative- including increase in energy   · Giving away possessions  · Change in eating patterns- significant weight changes-  positive or negative  · Change in sleeping patterns- unable to sleep or sleeping all the time   · Unwillingness or inability to communicate  · Depression  · Unusual sadness, discouragement and loneliness  · Talk of wanting to die  · Neglect of personal appearance   · Rebelliousness- reckless behavior  · Withdrawal from people/activities they love  · Confusion- inability to concentrate     If you or a loved one observes any of these behaviors or has concerns about self-harm, here's what you can do:  · Talk about it- your feelings and reasons for harming yourself  · Remove any means that you might use to hurt yourself (examples: pills, rope, extension cords, firearm)  · Get professional help from the community (Mental Health, Substance Abuse, psychological  counseling)  · Do not be alone:Call your Safe Contact- someone whom you trust who will be there for you.  · Call your local CRISIS HOTLINE 004-2072 or 226-569-2251  · Call your local Children's Mobile Crisis Response Team [place] (321) 046-7127 or www.FOBO  · Call the toll free National Suicide Prevention Hotlines   · National Suicide Prevention Lifeline 300-700-AIHY (5980)  · National Hope Line Network 800-SUICIDE (571-7191)

## 2019-09-26 NOTE — DISCHARGE PLANNING
Received Choice form at [phone]  Agency/Facility Name: [facility]  Referral sent per Choice form @ [phone]

## 2019-09-28 LAB
BACTERIA SPEC ANAEROBE CULT: NORMAL
SIGNIFICANT IND 70042: NORMAL
SITE SITE: NORMAL
SOURCE SOURCE: NORMAL

## 2019-10-02 ENCOUNTER — HOSPITAL ENCOUNTER (OUTPATIENT)
Dept: RADIOLOGY | Facility: MEDICAL CENTER | Age: 54
End: 2019-10-02
Attending: STUDENT IN AN ORGANIZED HEALTH CARE EDUCATION/TRAINING PROGRAM
Payer: COMMERCIAL

## 2019-10-02 DIAGNOSIS — M79.89 SWELLING OF LIMB: ICD-10-CM

## 2019-10-02 PROCEDURE — 93971 EXTREMITY STUDY: CPT | Mod: LT

## 2019-10-23 LAB
FUNGUS SPEC CULT: NORMAL
SIGNIFICANT IND 70042: NORMAL
SITE SITE: NORMAL
SOURCE SOURCE: NORMAL

## 2020-01-29 ENCOUNTER — HOSPITAL ENCOUNTER (OUTPATIENT)
Dept: HOSPITAL 8 - STAR | Age: 55
Discharge: HOME | End: 2020-01-29
Attending: ORTHOPAEDIC SURGERY
Payer: COMMERCIAL

## 2020-01-29 DIAGNOSIS — Z01.818: Primary | ICD-10-CM

## 2020-01-29 DIAGNOSIS — M16.11: ICD-10-CM

## 2020-01-29 LAB
ALBUMIN SERPL-MCNC: 3.6 G/DL (ref 3.4–5)
ALP SERPL-CCNC: 81 U/L (ref 45–117)
ALT SERPL-CCNC: 32 U/L (ref 12–78)
ANION GAP SERPL CALC-SCNC: 6 MMOL/L (ref 5–15)
APTT BLD: 27 SECONDS (ref 25–31)
BASOPHILS # BLD AUTO: 0.05 X10^3/UL (ref 0–0.1)
BASOPHILS NFR BLD AUTO: 1 % (ref 0–1)
BILIRUB SERPL-MCNC: 0.7 MG/DL (ref 0.2–1)
CALCIUM SERPL-MCNC: 8.8 MG/DL (ref 8.5–10.1)
CHLORIDE SERPL-SCNC: 107 MMOL/L (ref 98–107)
CREAT SERPL-MCNC: 0.87 MG/DL (ref 0.7–1.3)
EOSINOPHIL # BLD AUTO: 0.07 X10^3/UL (ref 0–0.4)
EOSINOPHIL NFR BLD AUTO: 1 % (ref 1–7)
ERYTHROCYTE [DISTWIDTH] IN BLOOD BY AUTOMATED COUNT: 14.2 % (ref 9.4–14.8)
EST. AVERAGE GLUCOSE BLD GHB EST-MCNC: 140 MG/DL (ref 0–126)
INR PPP: 1 (ref 0.93–1.1)
LYMPHOCYTES # BLD AUTO: 3.56 X10^3/UL (ref 1–3.4)
LYMPHOCYTES NFR BLD AUTO: 42 % (ref 22–44)
MCH RBC QN AUTO: 29.6 PG (ref 27.5–34.5)
MCHC RBC AUTO-ENTMCNC: 33.8 G/DL (ref 33.2–36.2)
MCV RBC AUTO: 87.7 FL (ref 81–97)
MD: NO
MONOCYTES # BLD AUTO: 0.56 X10^3/UL (ref 0.2–0.8)
MONOCYTES NFR BLD AUTO: 7 % (ref 2–9)
NEUTROPHILS # BLD AUTO: 4.2 X10^3/UL (ref 1.8–6.8)
NEUTROPHILS NFR BLD AUTO: 50 % (ref 42–75)
PLATELET # BLD AUTO: 253 X10^3/UL (ref 130–400)
PMV BLD AUTO: 7.5 FL (ref 7.4–10.4)
PROT SERPL-MCNC: 7.8 G/DL (ref 6.4–8.2)
PROTHROMBIN TIME: 10.6 SECONDS (ref 9.6–11.5)
RBC # BLD AUTO: 5.03 X10^6/UL (ref 4.38–5.82)

## 2020-01-29 PROCEDURE — 85730 THROMBOPLASTIN TIME PARTIAL: CPT

## 2020-01-29 PROCEDURE — 85025 COMPLETE CBC W/AUTO DIFF WBC: CPT

## 2020-01-29 PROCEDURE — 85610 PROTHROMBIN TIME: CPT

## 2020-01-29 PROCEDURE — 87806 HIV AG W/HIV1&2 ANTB W/OPTIC: CPT

## 2020-01-29 PROCEDURE — 80053 COMPREHEN METABOLIC PANEL: CPT

## 2020-01-29 PROCEDURE — G0475 HIV COMBINATION ASSAY: HCPCS

## 2020-01-29 PROCEDURE — 83036 HEMOGLOBIN GLYCOSYLATED A1C: CPT

## 2020-01-29 PROCEDURE — 93005 ELECTROCARDIOGRAM TRACING: CPT

## 2020-01-29 PROCEDURE — 36415 COLL VENOUS BLD VENIPUNCTURE: CPT

## 2020-01-29 PROCEDURE — 87081 CULTURE SCREEN ONLY: CPT

## 2020-02-03 ENCOUNTER — HOSPITAL ENCOUNTER (OUTPATIENT)
Dept: HOSPITAL 8 - OR | Age: 55
Setting detail: OBSERVATION
Discharge: HOME | End: 2020-02-03
Attending: ORTHOPAEDIC SURGERY | Admitting: ORTHOPAEDIC SURGERY
Payer: COMMERCIAL

## 2020-02-03 VITALS — BODY MASS INDEX: 39.45 KG/M2 | HEIGHT: 67 IN | WEIGHT: 251.33 LBS

## 2020-02-03 VITALS — SYSTOLIC BLOOD PRESSURE: 124 MMHG | DIASTOLIC BLOOD PRESSURE: 72 MMHG

## 2020-02-03 DIAGNOSIS — Z79.899: ICD-10-CM

## 2020-02-03 DIAGNOSIS — M19.90: ICD-10-CM

## 2020-02-03 DIAGNOSIS — M87.851: Primary | ICD-10-CM

## 2020-02-03 DIAGNOSIS — E66.9: ICD-10-CM

## 2020-02-03 PROCEDURE — 72170 X-RAY EXAM OF PELVIS: CPT

## 2020-02-03 PROCEDURE — 86900 BLOOD TYPING SEROLOGIC ABO: CPT

## 2020-02-03 PROCEDURE — C1776 JOINT DEVICE (IMPLANTABLE): HCPCS

## 2020-02-03 PROCEDURE — 27130 TOTAL HIP ARTHROPLASTY: CPT

## 2020-02-03 PROCEDURE — G0378 HOSPITAL OBSERVATION PER HR: HCPCS

## 2020-02-03 PROCEDURE — 86850 RBC ANTIBODY SCREEN: CPT

## 2020-02-03 PROCEDURE — C1713 ANCHOR/SCREW BN/BN,TIS/BN: HCPCS

## 2020-02-03 PROCEDURE — 97161 PT EVAL LOW COMPLEX 20 MIN: CPT

## 2020-02-03 PROCEDURE — 96365 THER/PROPH/DIAG IV INF INIT: CPT

## 2020-02-03 PROCEDURE — 97165 OT EVAL LOW COMPLEX 30 MIN: CPT

## 2020-02-03 PROCEDURE — 36415 COLL VENOUS BLD VENIPUNCTURE: CPT

## 2020-02-03 RX ADMIN — FENTANYL CITRATE PRN MCG: 50 INJECTION INTRAMUSCULAR; INTRAVENOUS at 11:57

## 2020-02-03 RX ADMIN — FENTANYL CITRATE PRN MCG: 50 INJECTION INTRAMUSCULAR; INTRAVENOUS at 11:52

## 2020-02-03 RX ADMIN — HYDROMORPHONE HYDROCHLORIDE PRN MG: 2 INJECTION INTRAMUSCULAR; INTRAVENOUS; SUBCUTANEOUS at 12:37

## 2020-02-03 RX ADMIN — HYDROMORPHONE HYDROCHLORIDE PRN MG: 2 INJECTION INTRAMUSCULAR; INTRAVENOUS; SUBCUTANEOUS at 12:12

## 2022-08-29 NOTE — OR NURSING
1717 Received report from Kaylynn RN, assumed care of pt at this time.     1725 Pt medicated again with dilaudid for pain 5/10 to surgical site.     1755 Pt states he feels like he is ready for discharge, pt dressed with assistance from Wife. Pt ambulated to BR, gait steady, void x1.     1800 Pt and Wife given instructions for discharge, evidence of understanding demonstrated. Pt meets criteria for discharge.     1810 Pt out to car in .   Zyclara Pregnancy And Lactation Text: This medication is Pregnancy Category C. It is unknown if this medication is excreted in breast milk.

## 2022-10-19 PROBLEM — M66.321 NONTRAUMATIC RUPTURE OF RIGHT PROXIMAL BICEPS TENDON: Status: ACTIVE | Noted: 2022-10-19

## 2022-10-19 PROBLEM — M75.50 SUBACROMIAL BURSITIS: Status: ACTIVE | Noted: 2022-10-19

## 2022-10-19 PROBLEM — M19.019 ACROMIOCLAVICULAR ARTHROSIS: Status: ACTIVE | Noted: 2022-10-19

## 2022-10-19 PROBLEM — M75.100 ROTATOR CUFF TEAR: Status: ACTIVE | Noted: 2022-10-19

## 2022-10-19 PROBLEM — S43.432A SUPERIOR GLENOID LABRUM LESION OF LEFT SHOULDER: Status: ACTIVE | Noted: 2022-10-19

## 2023-01-09 PROBLEM — L02.414: Status: ACTIVE | Noted: 2023-01-09

## 2023-01-19 PROBLEM — T81.49XA WOUND INFECTION AFTER SURGERY: Status: ACTIVE | Noted: 2023-01-19

## 2023-02-08 ENCOUNTER — OFFICE VISIT (OUTPATIENT)
Dept: INTERNAL MEDICINE | Facility: OTHER | Age: 58
End: 2023-02-08
Payer: COMMERCIAL

## 2023-02-08 VITALS
WEIGHT: 215 LBS | HEART RATE: 74 BPM | DIASTOLIC BLOOD PRESSURE: 77 MMHG | BODY MASS INDEX: 33.74 KG/M2 | HEIGHT: 67 IN | TEMPERATURE: 98.8 F | OXYGEN SATURATION: 96 % | SYSTOLIC BLOOD PRESSURE: 128 MMHG

## 2023-02-08 DIAGNOSIS — E66.09 CLASS 1 OBESITY DUE TO EXCESS CALORIES WITH SERIOUS COMORBIDITY AND BODY MASS INDEX (BMI) OF 33.0 TO 33.9 IN ADULT: ICD-10-CM

## 2023-02-08 DIAGNOSIS — Z12.12 SCREENING FOR COLORECTAL CANCER: ICD-10-CM

## 2023-02-08 DIAGNOSIS — Z12.11 SCREENING FOR COLORECTAL CANCER: ICD-10-CM

## 2023-02-08 DIAGNOSIS — E66.9 OBESITY (BMI 30-39.9): ICD-10-CM

## 2023-02-08 DIAGNOSIS — E11.59 TYPE 2 DIABETES MELLITUS WITH OTHER CIRCULATORY COMPLICATION, WITHOUT LONG-TERM CURRENT USE OF INSULIN (HCC): ICD-10-CM

## 2023-02-08 DIAGNOSIS — Z11.59 NEED FOR HEPATITIS C SCREENING TEST: ICD-10-CM

## 2023-02-08 PROBLEM — E78.5 HYPERLIPIDEMIA: Status: ACTIVE | Noted: 2019-10-02

## 2023-02-08 PROCEDURE — 99204 OFFICE O/P NEW MOD 45 MIN: CPT | Mod: GC | Performed by: STUDENT IN AN ORGANIZED HEALTH CARE EDUCATION/TRAINING PROGRAM

## 2023-02-08 RX ORDER — EMPAGLIFLOZIN 10 MG/1
1 TABLET, FILM COATED ORAL DAILY
COMMUNITY
Start: 2023-01-24 | End: 2023-02-08 | Stop reason: SDUPTHER

## 2023-02-08 RX ORDER — LINEZOLID 600 MG/1
TABLET, FILM COATED ORAL
COMMUNITY
Start: 2023-01-23 | End: 2023-02-08

## 2023-02-08 RX ORDER — SULFAMETHOXAZOLE AND TRIMETHOPRIM 800; 160 MG/1; MG/1
TABLET ORAL
COMMUNITY
Start: 2023-02-06 | End: 2023-05-02

## 2023-02-08 RX ORDER — ATORVASTATIN CALCIUM 40 MG/1
40 TABLET, FILM COATED ORAL NIGHTLY
Qty: 30 TABLET | Refills: 1 | Status: SHIPPED | OUTPATIENT
Start: 2023-02-08 | End: 2023-04-21 | Stop reason: SDUPTHER

## 2023-02-08 RX ORDER — METFORMIN HYDROCHLORIDE 500 MG/1
1000 TABLET, EXTENDED RELEASE ORAL
COMMUNITY
Start: 2023-01-24 | End: 2023-02-08 | Stop reason: SDUPTHER

## 2023-02-08 RX ORDER — EMPAGLIFLOZIN 10 MG/1
1 TABLET, FILM COATED ORAL DAILY
Qty: 30 TABLET | Refills: 1 | Status: SHIPPED | OUTPATIENT
Start: 2023-02-08 | End: 2023-04-21 | Stop reason: SDUPTHER

## 2023-02-08 RX ORDER — METFORMIN HYDROCHLORIDE 500 MG/1
1000 TABLET, EXTENDED RELEASE ORAL
COMMUNITY
Start: 2023-01-25 | End: 2023-02-08

## 2023-02-08 RX ORDER — METFORMIN HYDROCHLORIDE 500 MG/1
1000 TABLET, EXTENDED RELEASE ORAL DAILY
Qty: 60 TABLET | Refills: 1 | Status: SHIPPED | OUTPATIENT
Start: 2023-02-08 | End: 2023-04-21 | Stop reason: SDUPTHER

## 2023-02-08 ASSESSMENT — ENCOUNTER SYMPTOMS
ABDOMINAL PAIN: 0
COUGH: 0
SEIZURES: 0
DOUBLE VISION: 0
BLURRED VISION: 0
WHEEZING: 0
CHILLS: 0
SPEECH CHANGE: 0
NAUSEA: 0
NERVOUS/ANXIOUS: 0
SORE THROAT: 0
CONSTIPATION: 0
FALLS: 0
VOMITING: 0
FEVER: 0
DIARRHEA: 0
SHORTNESS OF BREATH: 0
PALPITATIONS: 0
HEADACHES: 0
MYALGIAS: 1
DIZZINESS: 0

## 2023-02-08 ASSESSMENT — PATIENT HEALTH QUESTIONNAIRE - PHQ9: CLINICAL INTERPRETATION OF PHQ2 SCORE: 0

## 2023-02-08 ASSESSMENT — LIFESTYLE VARIABLES: SUBSTANCE_ABUSE: 0

## 2023-02-08 ASSESSMENT — FIBROSIS 4 INDEX: FIB4 SCORE: 0.48

## 2023-02-08 NOTE — PATIENT INSTRUCTIONS
Jerry! Hoy nos vemos para:  -Diabetes:     Wyatt todos de poly pruebas de ben para diabetes.  Voy a referirle a education diabetico y tambien nutricion.    Wyatt ashu colonoscopy - ellos van a llamarle por telefono.  Vuelve a clinica en 2-3 months.    Muchas solomon!

## 2023-02-08 NOTE — PROGRESS NOTES
New Patient to Establish    Reason to establish: New patient to establish    CC:   Chief Complaint   Patient presents with    New Patient    Follow-Up     Left shoulder surgery and is bleeding , can provider check it    Diabetes       HPI:     Pt is a 56 y/o M with PMH significant for s/p L shoulder surgery 11/2022 (in physical therapy) complicated by Cutibacterium acnes infection (following orthopedics Dr. Montgomery), s/p pL hip surgery 2019 for femoral head fracture, here for the issues listed below. Is here with wife, Yvonne Proctor.    Left shoulder surgery, bleeding: Seeing Dr. Roth's office, last visit 2/1/2023. His next appt is scheduled to be in a month on Mar 1. He has regular physical therapy sessions. Patient got a shoulder surgery in 11/2022 and afterward had to have it debrided 2x at [place] due to necrosis. He was given antibiotics and has been taking them regularly. He finished his linezolid and is still taking his TMP/SMX. He has an appt tomorrow with [facility] for physical therapy.    Diabetes: In January 2023, the patient went to the hospital for an infection of the shoulder (left) from the surgery. There, he was diagnosed with uncontrolled diabetes with an A1c of 14% by Dr Ham Sky. He was started on Jardiance and metformin. Currently he does not have any problems with blurry vision, dizziness, lightheadedness, or feelings of falling/falls. He has a glucometer - 115-150 around noon every day or afternoon.    Urine microalbumin: <0.3, in 1/22/2023, gluc 226  Lab from 1/20/2023 at [facility] showed K 3.0  A1c 14% in 1/2023 by Dr Ham Sky    Gen: From [facility]. 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    HCM:  Colonoscopy: Never done, ordering today.  Due for vaccines: Hep B, COVID, PCV20, Shingrix, Tdap, flu (will defer vaccines to next visit)  -Due for hep C screennig (will order today)    Patient Active Problem List    Diagnosis Date Noted    Obesity (BMI 30-39.9) 02/08/2023    Obesity due to  "excess calories with serious comorbidity 02/08/2023    Wound infection after surgery 01/19/2023    Abscess of skin of left shoulder 01/09/2023    Rotator cuff tear 10/19/2022    Subacromial bursitis 10/19/2022    Nontraumatic rupture of right proximal biceps tendon 10/19/2022    Acromioclavicular arthrosis 10/19/2022    Superior glenoid labrum lesion of left shoulder 10/19/2022    Hyperlipidemia 10/02/2019    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 Pt. states will inform Dr. Myers of this.       Current Outpatient Medications   Medication Sig Dispense Refill    sulfamethoxazole-trimethoprim (BACTRIM DS) 800-160 MG tablet TAKE 1 TABLET BY MOUTH TWICE DAILY FOR 10 DAYS FOR ABSCESS.      atorvastatin (LIPITOR) 40 MG Tab Take 1 Tablet by mouth every evening. 30 Tablet 1    JARDIANCE 10 MG Tab Take 1 Tablet by mouth every day. for 30 days 30 Tablet 1    metFORMIN ER (GLUCOPHAGE XR) 500 MG TABLET SR 24 HR Take 2 Tablets by mouth every day. 60 Tablet 1     No current facility-administered medications for this visit.       Allergies as of 02/08/2023    (No Known Allergies)       Social History     Socioeconomic History    Marital status:      Spouse name: Not on file    Number of children: Not on file    Years of education: Not on file    Highest education level: Not on file   Occupational History    Occupation: Baker   Tobacco Use    Smoking status: Never    Smokeless tobacco: Never   Vaping Use    Vaping Use: Never " used   Substance and Sexual Activity    Alcohol use: Not Currently     Comment: 3/wk    Drug use: No    Sexual activity: Yes     Partners: Female   Other Topics Concern    Not on file   Social History Narrative    Not on file     Social Determinants of Health     Financial Resource Strain: Not on file   Food Insecurity: Not on file   Transportation Needs: Not on file   Physical Activity: Not on file   Stress: Not on file   Social Connections: Not on file   Intimate Partner Violence: Not on file   Housing Stability: Not on file       History reviewed. No pertinent family history.    Past Surgical History:   Procedure Laterality Date    PB INCIS/DRAIN SHLDR ABSC/HEMA,DEEP Left 1/20/2023    Procedure: LEFT SHOULDER, INCISION AND DRAINAGE, SHOULDER REGION;  Surgeon: Ricardo Montgomery M.D.;  Location: [facility];  Service: Orthopedics    [facility]  [facility]    Procedure: Left shoulder scope rotator cuff repair, biceps tenodesis, subacromial decompression,with extensive debridement, distal clavicle resection and repairs as indicated;  Surgeon: Ricardo Montgomery M.D.;  Location: [facility];  Service: Orthopedics    PB ARTHROSCOPY SHOULDER SURGICAL BICEPS TENODES*  11/22/2022    Procedure: ARTHROSCOPY, SHOULDER, WITH BICEPS TENODESIS;  Surgeon: Ricardo Montgomery M.D.;  Location: [facility];  Service: Orthopedics    [facility]  [facility]    Procedure: DECOMPRESSION, SHOULDER, ARTHROSCOPIC;  Surgeon: Ricardo Montgomery M.D.;  Location: [facility];  Service: Orthopedics    [facility] 3+  11/22/2022    Procedure: ARTHROSCOPY, SHOULDER, WITH EXTENSIVE DEBRIDEMENT;  Surgeon: Ricardo Montgomery M.D.;  Location: [facility];  Service: Orthopedics    CLAVICLE DISTAL EXCISION  11/22/2022    Procedure: EXCISION, CLAVICLE, DISTAL;  Surgeon: Ricardo Montgomery  "M.D.;  Location: [facility];  Service: Orthopedics    CT TOTAL HIP ARTHROPLASTY Left 9/25/2019    Procedure: ARTHROPLASTY, HIP, TOTAL;  Surgeon: Nahid Currie M.D.;  Location: SUR[facility]ERY [facility];  Service: Orthopedics    INGUINAL HERNIA REPAIR Left 7/18/2018    Procedure: INGUINAL HERNIA REPAIR;  Surgeon: J Carlos Myers M.D.;  Location: SURGERY SAME DAY [facility];  Service: General    UMBILICAL HERNIA REPAIR N/A 7/18/2018    Procedure: UMBILICAL HERNIA REPAIR;  Surgeon: J Carlos Myers M.D.;  Location: SURGERY SAME DAY [facility];  Service: General    OTHER ORTHOPEDIC SURGERY      Meniscus Knee Surgery Left Knee 2010 & Right Knee 2004       Review of Systems   Constitutional:  Negative for chills, fever and malaise/fatigue.   HENT:  Negative for congestion and sore throat.    Eyes:  Negative for blurred vision and double vision.   Respiratory:  Negative for cough, shortness of breath and wheezing.    Cardiovascular:  Negative for chest pain, palpitations and leg swelling.   Gastrointestinal:  Negative for abdominal pain, constipation, diarrhea, nausea and vomiting.   Genitourinary:  Negative for dysuria, frequency and urgency.   Musculoskeletal:  Positive for joint pain and myalgias. Negative for falls.        +pain upon movement of left shoulder   Neurological:  Negative for dizziness, speech change, seizures and headaches.   Psychiatric/Behavioral:  Negative for substance abuse and suicidal ideas. The patient is not nervous/anxious.        /77 (BP Location: Right arm, Patient Position: Sitting, BP Cuff Size: Adult)   Pulse 74   Temp 37.1 °C (98.8 °F) (Temporal)   Ht 1.702 m (5' 7\")   Wt 97.5 kg (215 lb)   SpO2 96%   BMI 33.67 kg/m²     Physical Exam    Physical Exam  Vitals and nursing note reviewed. Exam conducted with a chaperone present (wife present at bedside).   Constitutional:       General: He is not in acute distress.     Appearance: He is obese. He " is not ill-appearing.   HENT:      Head: Normocephalic and atraumatic.      Right Ear: External ear normal.      Left Ear: External ear normal.      Nose: No congestion or rhinorrhea.      Mouth/Throat:      Mouth: Mucous membranes are moist.      Pharynx: Oropharynx is clear.   Eyes:      General: No scleral icterus.        Right eye: No discharge.         Left eye: No discharge.      Extraocular Movements: Extraocular movements intact.      Conjunctiva/sclera: Conjunctivae normal.      Pupils: Pupils are equal, round, and reactive to light.   Cardiovascular:      Rate and Rhythm: Normal rate and regular rhythm.      Pulses: Normal pulses.           Dorsalis pedis pulses are 2+ on the right side and 2+ on the left side.      Heart sounds: Normal heart sounds.   Pulmonary:      Effort: Pulmonary effort is normal. No respiratory distress.      Breath sounds: Normal breath sounds. No stridor. No wheezing or rales.   Abdominal:      General: Bowel sounds are normal. There is no distension.      Palpations: Abdomen is soft.      Tenderness: There is no abdominal tenderness. There is no right CVA tenderness, left CVA tenderness or guarding.   Musculoskeletal:         General: Tenderness and signs of injury present. No swelling. Normal range of motion.      Cervical back: Normal range of motion and neck supple. No rigidity.      Right lower leg: No edema.      Left lower leg: No edema.      Right foot: Normal range of motion. No deformity or bunion.      Left foot: Normal range of motion. No deformity or bunion.      Comments: Tenderness upon movement of LUE, cannot abduct past 70 deg of LUE.    Feet:      Right foot:      Protective Sensation: 10 sites tested.  10 sites sensed.      Skin integrity: No ulcer, blister, skin breakdown, erythema or warmth.      Toenail Condition: Right toenails are normal.      Left foot:      Protective Sensation: 10 sites tested.  10 sites sensed.      Skin integrity: No ulcer, blister,  skin breakdown, erythema or warmth.      Toenail Condition: Left toenails are normal.   Skin:     General: Skin is warm and dry.      Capillary Refill: Capillary refill takes less than 2 seconds.      Coloration: Skin is not jaundiced.   Neurological:      General: No focal deficit present.      Mental Status: He is alert and oriented to person, place, and time.      Cranial Nerves: No cranial nerve deficit.      Sensory: No sensory deficit.      Motor: No weakness.   Psychiatric:         Mood and Affect: Mood normal.         Behavior: Behavior normal.         Thought Content: Thought content normal.         Judgment: Judgment normal.     Monofilament testing with a 10 gram force: sensation intact: intact bilaterally  Visual Inspection: Feet without maceration, ulcers, fissures.  Pedal pulses: intact bilaterally    Note: I have reviewed all pertinent labs and diagnostic tests associated with this visit with specific comments listed under the assessment and plan below    Assessment and Plan  1. Type 2 diabetes mellitus with other circulatory complication, without long-term current use of insulin (HCC)  This is a new problem. Patient was diagnosed in 1/2023 with an A1c of 14%. Will order all routine labs. Also performed diabetic monofilament exam today in clinic. Refilled diabetes medications. I have also referred the patient to Diabetic Education and Nutrition services for further guidance. Will follow up with patient in 2-3 months and repeat A1c. Encouraged patient to continue checking BG levels regularly.    I have counseled the patient on the importance of checking his feet regularly and to monitor for wounds. I have also counseled him on the consequences of uncontrolled diabetes, including but not limited to: stroke, heart attack, uncontrolled infections, and death. I also told him that his uncontrolled diabetes is contributing greatly to the poor wound healing in his left shoulder resulting in repeated  debridements. He expressed understanding of the importance of needing strict blood glucose control and stated he would complete all of his labs.    - Comp Metabolic Panel; Future  - Lipid Profile; Future  - Referral to Ophthalmology  - Diabetic Monofilament Lower Extremity Exam  - Referral to Diabetic Education; Future  - atorvastatin (LIPITOR) 40 MG Tab; Take 1 Tablet by mouth every evening.  Dispense: 30 Tablet; Refill: 1  - JARDIANCE 10 MG Tab; Take 1 Tablet by mouth every day. for 30 days  Dispense: 30 Tablet; Refill: 1  - metFORMIN ER (GLUCOPHAGE XR) 500 MG TABLET SR 24 HR; Take 2 Tablets by mouth every day.  Dispense: 60 Tablet; Refill: 1  - Referral to Nutrition Services    2. Obesity (BMI 30-39.9)  Discussed regular diet and exercise with patient. Also referred patient to diabetic education and nutrition services.  - Patient identified as having weight management issue.  Appropriate orders and counseling given.    3. Class 1 obesity due to excess calories with serious comorbidity and body mass index (BMI) of 33.0 to 33.9 in adult  See issue of obesity with BMI 30-39.9.  - Patient identified as having weight management issue.  Appropriate orders and counseling given.    4. Need for hepatitis C screening test  Ordered test for screening per USPTF guidelines.  - HEP C VIRUS ANTIBODY; Future    5. Screening for colorectal cancer  Referred to GI for colonoscopy per guidelines to start colonoscopies at age 57; patient states he has never had one before.  - Referral to GI for Colonoscopy      Followup: Return in about 3 months (around 5/8/2023) for Long.    Risk Assessment (discuss potential complications a function of chronic problems): moderate    Signed by: Keri Nash M.D.

## 2023-04-21 DIAGNOSIS — E11.59 TYPE 2 DIABETES MELLITUS WITH OTHER CIRCULATORY COMPLICATION, WITHOUT LONG-TERM CURRENT USE OF INSULIN (HCC): ICD-10-CM

## 2023-04-21 RX ORDER — ATORVASTATIN CALCIUM 40 MG/1
40 TABLET, FILM COATED ORAL NIGHTLY
Qty: 30 TABLET | Refills: 1 | Status: SHIPPED | OUTPATIENT
Start: 2023-04-21 | End: 2023-08-29 | Stop reason: SDUPTHER

## 2023-04-21 RX ORDER — EMPAGLIFLOZIN 10 MG/1
1 TABLET, FILM COATED ORAL DAILY
Qty: 30 TABLET | Refills: 1 | Status: SHIPPED | OUTPATIENT
Start: 2023-04-21 | End: 2023-07-27 | Stop reason: SDUPTHER

## 2023-04-21 RX ORDER — METFORMIN HYDROCHLORIDE 500 MG/1
1000 TABLET, EXTENDED RELEASE ORAL DAILY
Qty: 60 TABLET | Refills: 1 | Status: SHIPPED | OUTPATIENT
Start: 2023-04-21 | End: 2023-07-27 | Stop reason: SDUPTHER

## 2023-05-02 ENCOUNTER — TELEPHONE (OUTPATIENT)
Dept: INTERNAL MEDICINE | Facility: OTHER | Age: 58
End: 2023-05-02

## 2023-05-02 ENCOUNTER — OFFICE VISIT (OUTPATIENT)
Dept: INTERNAL MEDICINE | Facility: OTHER | Age: 58
End: 2023-05-02
Payer: COMMERCIAL

## 2023-05-02 VITALS
HEIGHT: 67 IN | BODY MASS INDEX: 34.56 KG/M2 | HEART RATE: 73 BPM | SYSTOLIC BLOOD PRESSURE: 115 MMHG | OXYGEN SATURATION: 96 % | WEIGHT: 220.2 LBS | TEMPERATURE: 98 F | DIASTOLIC BLOOD PRESSURE: 59 MMHG

## 2023-05-02 DIAGNOSIS — L29.9 ITCHING: ICD-10-CM

## 2023-05-02 DIAGNOSIS — Z23 NEED FOR VACCINATION AGAINST STREPTOCOCCUS PNEUMONIAE: ICD-10-CM

## 2023-05-02 DIAGNOSIS — Z23 NEED FOR TETANUS, DIPHTHERIA, AND ACELLULAR PERTUSSIS (TDAP) VACCINE: ICD-10-CM

## 2023-05-02 DIAGNOSIS — E11.9 TYPE 2 DIABETES MELLITUS WITHOUT COMPLICATION, WITHOUT LONG-TERM CURRENT USE OF INSULIN (HCC): ICD-10-CM

## 2023-05-02 LAB
HBA1C MFR BLD: 6.2 % (ref ?–5.8)
POCT INT CON NEG: NEGATIVE
POCT INT CON POS: POSITIVE

## 2023-05-02 PROCEDURE — 90677 PCV20 VACCINE IM: CPT | Performed by: STUDENT IN AN ORGANIZED HEALTH CARE EDUCATION/TRAINING PROGRAM

## 2023-05-02 PROCEDURE — 99213 OFFICE O/P EST LOW 20 MIN: CPT | Mod: 25,GC | Performed by: STUDENT IN AN ORGANIZED HEALTH CARE EDUCATION/TRAINING PROGRAM

## 2023-05-02 PROCEDURE — 90471 IMMUNIZATION ADMIN: CPT | Performed by: STUDENT IN AN ORGANIZED HEALTH CARE EDUCATION/TRAINING PROGRAM

## 2023-05-02 PROCEDURE — 83036 HEMOGLOBIN GLYCOSYLATED A1C: CPT | Performed by: STUDENT IN AN ORGANIZED HEALTH CARE EDUCATION/TRAINING PROGRAM

## 2023-05-02 PROCEDURE — 90472 IMMUNIZATION ADMIN EACH ADD: CPT | Performed by: STUDENT IN AN ORGANIZED HEALTH CARE EDUCATION/TRAINING PROGRAM

## 2023-05-02 PROCEDURE — 90715 TDAP VACCINE 7 YRS/> IM: CPT | Performed by: STUDENT IN AN ORGANIZED HEALTH CARE EDUCATION/TRAINING PROGRAM

## 2023-05-02 ASSESSMENT — FIBROSIS 4 INDEX: FIB4 SCORE: 0.48

## 2023-05-02 NOTE — PROGRESS NOTES
Established Patient    Yoni Conklin is a 57 y.o. male who presents today with the following:    CC:   Chief Complaint   Patient presents with    Lab Results    Follow-Up     Pt concerned he might be alllergic to a medication but unsure on which one       HPI:     Pt is a 56 y/o M with PMH of T2DM (A1c 6.2% in 5/2023), HLD, s/p L shoulder surgery 11/2022 (in physical therapy) complicated by Cutibacterium acnes infection (following orthopedics Dr. Montgomery), s/p pL hip surgery 2019 for femoral head fracture, here for follow up.    Diabetes: He is checking his BG levels regularly and they are in the 100s-130s. No hypoglycemia symptoms including blurry vision and dizziness. He is eating more vegetables and protein, less carbs. He has not been doing a lot of exercise.    Discussed lab results.    Itching: He has been having itching everywhere on his arms and legs every night ever since he started his diabetes mediccations.  He has been taking his metformin, jardiance, and atorvastatin as prescribed every day.  He takes 1 medication at 6 AM 1, 1 medication at 8 AM, and atorvastatin at night.  He does not know which medication he takes at 6 AM of which medication he takes at 8 AM although they are metformin or Jardiance, respectively.  He has not used any new soaps, detergents, new clothing.    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 30 Tablet 1    JARDIANCE 10 MG Tab tablet Take 1 Tablet by mouth every day. for 30 days 30 Tablet 1    metFORMIN ER (GLUCOPHAGE XR) 500 MG TABLET SR 24 HR Take 2 Tablets by mouth every day. 60 Tablet 1    sulfamethoxazole-trimethoprim (BACTRIM DS) 800-160 MG tablet TAKE 1 TABLET BY MOUTH TWICE DAILY FOR 10 DAYS FOR ABSCESS. (Patient not taking: Reported on 5/2/2023)       No current facility-administered medications for this visit.       /59 (BP Location: Right arm, Patient Position: Sitting, BP Cuff Size: Adult)   Pulse 73   Temp 36.7 °C (98 °F) (Temporal)   Ht 1.702 m (5' 7\")   Wt 99.9 kg (220 lb 3.2 oz)   SpO2 96%   BMI 34.49 kg/m²     PHYSICAL EXAM:  Physical Exam  Vitals and nursing note reviewed.   Constitutional:       General: He is not in acute distress.     Appearance: He is obese. He is not ill-appearing.   HENT:      Head: Normocephalic and atraumatic.      Right Ear: External ear normal.      Left Ear: External ear normal.      Nose: No congestion or rhinorrhea.      Mouth/Throat:      Mouth: Mucous membranes are moist.      Pharynx: Oropharynx is clear.   Eyes:      General: No scleral icterus.        Right eye: No discharge.         Left eye: No discharge.      Extraocular Movements: Extraocular movements intact.      Conjunctiva/sclera: Conjunctivae normal.      Pupils: Pupils are equal, round, and reactive to light.   Cardiovascular:      Rate and Rhythm: Normal rate and " regular rhythm.      Pulses: Normal pulses.      Heart sounds: Normal heart sounds.   Pulmonary:      Effort: Pulmonary effort is normal. No respiratory distress.      Breath sounds: Normal breath sounds. No stridor. No wheezing or rales.   Abdominal:      General: Bowel sounds are normal. There is no distension.      Palpations: Abdomen is soft.      Tenderness: There is no abdominal tenderness. There is no right CVA tenderness, left CVA tenderness or guarding.   Musculoskeletal:         General: No swelling or tenderness. Normal range of motion.      Cervical back: Normal range of motion and neck supple. No rigidity.      Right lower leg: No edema.      Left lower leg: No edema.   Skin:     General: Skin is warm and dry.      Capillary Refill: Capillary refill takes less than 2 seconds.      Coloration: Skin is not jaundiced.      Comments: No excoriations noted on skin   Neurological:      General: No focal deficit present.      Mental Status: He is alert and oriented to person, place, and time.      Cranial Nerves: No cranial nerve deficit.      Sensory: No sensory deficit.      Motor: No weakness.      Gait: Gait normal.   Psychiatric:         Mood and Affect: Mood normal.         Behavior: Behavior normal.         Thought Content: Thought content normal.         Judgment: Judgment normal.         Assessment and Plan  1. Type 2 diabetes mellitus without complication, without long-term current use of insulin (HCC)  This is a chronic issue.  Patient has improved his diet.  Also counseled on exercise since patient currently states that he does not exercise.  Patient has been checking his blood sugars at home, they are regularly in the 100-1 30s.  Praise given for improved blood sugar control.  A1c is 6.2% today in clinic.    Follow-up in 3 months for A1c.  May consider stopping Jardiance if this is causing his itching (also see problem of itching).  - POCT  A1C    2. Need for vaccination against Streptococcus  pneumoniae  Vaccine given today per guidelines from [place] preventative task force  - Pneumococcal Conjugate Vaccine 20-Valent (19 yrs+)    3. Need for tetanus, diphtheria, and acellular pertussis (Tdap) vaccine  Vaccine given today; per guidelines, patient is to receive tetanus vaccine every 10 years.  - Tdap Vaccine =>6YO IM    4. Itching  This is a chronic problem that has been happening ever since he started his atorvastatin, Jardiance, and metformin.  We have reached a shared physician-patient decision making: Patient will trial removing 1 medication at a time for several days and watching to see if his skin improves.  Patient will let me know via "[employer]t or call the clinic to let me know which med is causing his medical issues.  Can reevaluate need for metformin and/or Jardiance given the patient's good blood sugar control.  If needed, will switch from atorvastatin to ezetimibe or other medication if statin is causing his symptoms.    Follow-up in 3 months with Dr. Rea for continued care of diabetes.    Signed by: Keri Nash M.D.

## 2023-05-02 NOTE — PATIENT INSTRUCTIONS
Jerry! Hoy nos vemos para:  -Diabetes  -Vacunas    Para de mendez imani de poly medicamentos por unos melchor, y vamos a averiguar si tiene comezon. Cambia a otro medicamento por unos melchor mas.     Llamanos por telefono y diganos que medicamento causa el comezon. O escribe un mensaje en MyChart.    Regresa a clinica en 3 meses on Dr Rea.    Muchas solomon!

## 2023-05-03 NOTE — TELEPHONE ENCOUNTER
Please schedule the patient for an appointment with Dr. Rea in 3 months for follow-up of diabetes.  Thank you very much!    Keri Nash MD, MPH  [facility], PGY-3

## 2023-07-27 DIAGNOSIS — E11.59 TYPE 2 DIABETES MELLITUS WITH OTHER CIRCULATORY COMPLICATION, WITHOUT LONG-TERM CURRENT USE OF INSULIN (HCC): ICD-10-CM

## 2023-07-27 NOTE — TELEPHONE ENCOUNTER
Pt is scheduled to re establish with Dr. Vincent in August 21 and is needing refills on his medications before then.     Please review and resend

## 2023-07-28 RX ORDER — EMPAGLIFLOZIN 10 MG/1
1 TABLET, FILM COATED ORAL DAILY
Qty: 30 TABLET | Refills: 1 | Status: SHIPPED | OUTPATIENT
Start: 2023-07-28 | End: 2023-08-29

## 2023-07-28 RX ORDER — METFORMIN HYDROCHLORIDE 500 MG/1
1000 TABLET, EXTENDED RELEASE ORAL DAILY
Qty: 60 TABLET | Refills: 1 | Status: SHIPPED | OUTPATIENT
Start: 2023-07-28 | End: 2023-08-29 | Stop reason: SDUPTHER

## 2023-08-29 ENCOUNTER — OFFICE VISIT (OUTPATIENT)
Dept: INTERNAL MEDICINE | Facility: OTHER | Age: 58
End: 2023-08-29
Payer: COMMERCIAL

## 2023-08-29 VITALS
TEMPERATURE: 97.8 F | HEIGHT: 68 IN | OXYGEN SATURATION: 97 % | HEART RATE: 86 BPM | WEIGHT: 234 LBS | DIASTOLIC BLOOD PRESSURE: 74 MMHG | BODY MASS INDEX: 35.46 KG/M2 | SYSTOLIC BLOOD PRESSURE: 126 MMHG

## 2023-08-29 DIAGNOSIS — E66.9 OBESITY (BMI 30-39.9): ICD-10-CM

## 2023-08-29 DIAGNOSIS — E78.5 HYPERLIPIDEMIA, UNSPECIFIED HYPERLIPIDEMIA TYPE: ICD-10-CM

## 2023-08-29 DIAGNOSIS — Z11.59 ENCOUNTER FOR HEPATITIS C SCREENING TEST FOR LOW RISK PATIENT: ICD-10-CM

## 2023-08-29 DIAGNOSIS — N52.8 OTHER MALE ERECTILE DYSFUNCTION: ICD-10-CM

## 2023-08-29 DIAGNOSIS — E11.59 TYPE 2 DIABETES MELLITUS WITH OTHER CIRCULATORY COMPLICATION, WITHOUT LONG-TERM CURRENT USE OF INSULIN (HCC): ICD-10-CM

## 2023-08-29 PROBLEM — S43.432A SUPERIOR GLENOID LABRUM LESION OF LEFT SHOULDER: Status: RESOLVED | Noted: 2022-10-19 | Resolved: 2023-08-29

## 2023-08-29 PROBLEM — M75.100 ROTATOR CUFF TEAR: Status: RESOLVED | Noted: 2022-10-19 | Resolved: 2023-08-29

## 2023-08-29 PROBLEM — M75.50 SUBACROMIAL BURSITIS: Status: RESOLVED | Noted: 2022-10-19 | Resolved: 2023-08-29

## 2023-08-29 PROBLEM — M87.052 ASEPTIC NECROSIS OF BONE OF LEFT HIP (HCC): Status: RESOLVED | Noted: 2019-09-26 | Resolved: 2023-08-29

## 2023-08-29 PROBLEM — M66.321 NONTRAUMATIC RUPTURE OF RIGHT PROXIMAL BICEPS TENDON: Status: RESOLVED | Noted: 2022-10-19 | Resolved: 2023-08-29

## 2023-08-29 PROBLEM — T81.49XA WOUND INFECTION AFTER SURGERY: Status: RESOLVED | Noted: 2023-01-19 | Resolved: 2023-08-29

## 2023-08-29 PROBLEM — M19.019 ACROMIOCLAVICULAR ARTHROSIS: Status: RESOLVED | Noted: 2022-10-19 | Resolved: 2023-08-29

## 2023-08-29 PROBLEM — G89.29 CHRONIC PAIN: Status: RESOLVED | Noted: 2019-09-25 | Resolved: 2023-08-29

## 2023-08-29 PROBLEM — S72.059A FRACTURE OF FEMORAL HEAD (HCC): Status: RESOLVED | Noted: 2019-09-26 | Resolved: 2023-08-29

## 2023-08-29 PROBLEM — G47.33 OBSTRUCTIVE SLEEP APNEA SYNDROME: Status: RESOLVED | Noted: 2019-09-25 | Resolved: 2023-08-29

## 2023-08-29 PROBLEM — L29.9 ITCHING: Status: RESOLVED | Noted: 2023-05-02 | Resolved: 2023-08-29

## 2023-08-29 PROBLEM — L02.414: Status: RESOLVED | Noted: 2023-01-09 | Resolved: 2023-08-29

## 2023-08-29 PROCEDURE — 3074F SYST BP LT 130 MM HG: CPT

## 2023-08-29 PROCEDURE — 99214 OFFICE O/P EST MOD 30 MIN: CPT | Mod: GC

## 2023-08-29 PROCEDURE — 3078F DIAST BP <80 MM HG: CPT

## 2023-08-29 RX ORDER — ATORVASTATIN CALCIUM 40 MG/1
40 TABLET, FILM COATED ORAL NIGHTLY
Qty: 30 TABLET | Refills: 1 | Status: SHIPPED | OUTPATIENT
Start: 2023-08-29 | End: 2023-12-05 | Stop reason: SDUPTHER

## 2023-08-29 RX ORDER — METFORMIN HYDROCHLORIDE 500 MG/1
1000 TABLET, EXTENDED RELEASE ORAL DAILY
Qty: 60 TABLET | Refills: 1 | Status: SHIPPED | OUTPATIENT
Start: 2023-08-29 | End: 2023-12-05 | Stop reason: SDUPTHER

## 2023-08-29 ASSESSMENT — FIBROSIS 4 INDEX: FIB4 SCORE: 0.48

## 2023-08-29 ASSESSMENT — ENCOUNTER SYMPTOMS
CHILLS: 0
CONSTIPATION: 0
DEPRESSION: 0
WEAKNESS: 0
COUGH: 0
SORE THROAT: 0
BLURRED VISION: 0
DIARRHEA: 0
ABDOMINAL PAIN: 0
FEVER: 0
DIZZINESS: 0
SHORTNESS OF BREATH: 0
HEADACHES: 0
MYALGIAS: 0
DOUBLE VISION: 0
VOMITING: 0

## 2023-08-29 NOTE — PATIENT INSTRUCTIONS
Por favor, tener the vaccina de shingles a la pharmacia  Por favor, tener los labs antes de tu lon proxima  
Imaging Studies/Medications

## 2023-08-29 NOTE — PROGRESS NOTES
"    NEW PATIENT VISIT      Patient ID:  Name: Yoni Mckeon    YOB: 1965    Chief Complaint(s):      Establish Care with Primary Care Physician  Establish Care      History of Present Illness:    This is a pleasant 57 y.o. Slovenian-speaking male with notable PMH of T2DM, HLD, and obesity who presents to clinic today to re-establish care with me as PCP and to request medication refills of metformin and atorvastatin.    Patient denies any acute concerns or symptoms bothering him today. He does mention a 2 year history of worsening erectile dysfunction which he would like to be evaluated for. He reports occasional morning erections but states that they are not \"full erections\".    To be discussed next visit:  - lab results  - medication options for ED    Review of Systems   Constitutional:  Negative for chills and fever.   HENT:  Negative for congestion and sore throat.    Eyes:  Negative for blurred vision and double vision.   Respiratory:  Negative for cough and shortness of breath.    Cardiovascular:  Negative for chest pain and leg swelling.   Gastrointestinal:  Negative for abdominal pain, constipation, diarrhea and vomiting.   Genitourinary:  Negative for dysuria and urgency.   Musculoskeletal:  Positive for joint pain (BL knees). Negative for myalgias.   Neurological:  Negative for dizziness, weakness and headaches.   Psychiatric/Behavioral:  Negative for depression.        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 ([facility]) 09/26/2019    Resolved s/p left SAMANTHA (2019)    History of Inguinal hernia 07/11/2018    History of Umbilical hernia 07/11/2018    Nontraumatic rupture of right proximal biceps tendon 10/19/2022    Resolved s/p surgery 2022    Obesity (BMI 30-39.9) 02/08/2023    Other male erectile dysfunction 08/29/2023    RA (rheumatoid arthritis) ([facility]) 07/11/2018    \"Leg\"    Rotator cuff tear 10/19/2022    Resolved s/p surgery 2022    Type 2 " diabetes mellitus      Past Surgical History:   Procedure Laterality Date    PB INCIS/DRAIN SHLDR ABSC/HEMA,DEEP Left 01/20/2023    Procedure: LEFT SHOULDER, INCISION AND DRAINAGE, SHOULDER REGION;  Surgeon: Ricardo Montgomery M.D.;  Location: [facility];  Service: Orthopedics    [facility]  11/22/2022    Procedure: Left shoulder scope rotator cuff repair, biceps tenodesis, subacromial decompression,with extensive debridement, distal clavicle resection and repairs as indicated;  Surgeon: Ricardo Montgomery M.D.;  Location: [facility];  Service: Orthopedics    PB ARTHROSCOPY SHOULDER SURGICAL BICEPS TENODES*  11/22/2022    Procedure: ARTHROSCOPY, SHOULDER, WITH BICEPS TENODESIS;  Surgeon: Ricardo Montgomery M.D.;  Location: [facility];  Service: Orthopedics    [facility]  [facility]    Procedure: DECOMPRESSION, SHOULDER, ARTHROSCOPIC;  Surgeon: Ricardo Montgomery M.D.;  Location: [facility];  Service: Orthopedics    [facility]  11/22/2022    Procedure: ARTHROSCOPY, SHOULDER, WITH EXTENSIVE DEBRIDEMENT;  Surgeon: Ricardo Montgomery M.D.;  Location: [facility];  Service: Orthopedics    CLAVICLE DISTAL EXCISION  11/22/2022    Procedure: EXCISION, CLAVICLE, DISTAL;  Surgeon: Ricardo Montgomery M.D.;  Location: [facility];  Service: Orthopedics    HIP ARTHROPLASTY TOTAL Right 2020    ID TOTAL HIP ARTHROPLASTY Left 09/25/2019    Procedure: ARTHROPLASTY, HIP, TOTAL;  Surgeon: Nahid Currie M.D.;  Location: SURGERY [facility];  Service: Orthopedics    INGUINAL HERNIA REPAIR Left 07/18/2018    Procedure: INGUINAL HERNIA REPAIR;  Surgeon: J Carlos Myers M.D.;  Location: SURGERY SAME DAY [facility];  Service: General    UMBILICAL HERNIA REPAIR N/A 07/18/2018    Procedure: UMBILICAL HERNIA REPAIR;  Surgeon: J Carlos MURCIA  "RONA Myers;  Location: SURGERY SAME DAY [facility];  Service: General    OTHER ORTHOPEDIC SURGERY  2010    Meniscus Knee Surgery Left Knee 2010 & Right Knee 2004     No family history on file.  Social History     Tobacco Use    Smoking status: Never    Smokeless tobacco: Never   Vaping Use    Vaping Use: Never used   Substance Use Topics    Alcohol use: Not Currently     Comment: 3/wk    Drug use: No     Current Outpatient Medications   Medication Sig Dispense Refill    atorvastatin (LIPITOR) 40 MG Tab Take 1 Tablet by mouth every evening. 30 Tablet 1    metFORMIN ER (GLUCOPHAGE XR) 500 MG TABLET SR 24 HR Take 2 Tablets by mouth every day. 60 Tablet 1     No current facility-administered medications for this visit.       Objective:    /74 (BP Location: Right arm, Patient Position: Sitting, BP Cuff Size: Large adult)   Pulse 86   Temp 36.6 °C (97.8 °F) (Temporal)   Ht 1.715 m (5' 7.5\")   Wt 106 kg (234 lb)   SpO2 97%   BMI 36.11 kg/m² Body mass index is 36.11 kg/m².  Physical Exam  Vitals reviewed.   Constitutional:       General: He is not in acute distress.     Appearance: Normal appearance. He is obese. He is not ill-appearing.   HENT:      Head: Normocephalic.      Nose: Nose normal.      Mouth/Throat:      Mouth: Mucous membranes are moist.      Pharynx: Oropharynx is clear.   Eyes:      General: No scleral icterus.     Conjunctiva/sclera: Conjunctivae normal.   Cardiovascular:      Rate and Rhythm: Normal rate.      Pulses: Normal pulses.      Heart sounds: Normal heart sounds. No murmur heard.  Pulmonary:      Effort: Pulmonary effort is normal. No respiratory distress.      Breath sounds: Normal breath sounds.   Abdominal:      General: There is distension (secondary to body habitus).      Tenderness: There is no abdominal tenderness.   Musculoskeletal:         General: No tenderness. Normal range of motion.      Cervical back: Normal range of motion.      Right lower leg: No edema.      " Left lower leg: No edema.   Skin:     General: Skin is warm and dry.      Coloration: Skin is not jaundiced or pale.   Neurological:      General: No focal deficit present.      Mental Status: He is alert and oriented to person, place, and time. Mental status is at baseline.      Motor: No weakness.      Gait: Gait normal.   Psychiatric:         Mood and Affect: Mood normal.         Behavior: Behavior normal.         Thought Content: Thought content normal.         Assessment and Plan:     Problem List Items Addressed This Visit       Type 2 diabetes mellitus, without long-term current use of insulin ([facility])     Patient with ICD-10 code: E11.9 Controlled type 2 Diabetes Mellitus. Most recent hemoglobin A1c was 6.2%. A1c goal is A1C GOAL: < 6.    - Reviewed routine follow-ups with patient:    *Hemoglobin A1C (q3 months): past due, new lab order placed today.    *Lipid panel (yearly): past due, new lab order placed today. and Since patient is 40-75 years old, he/she/they are currently on high-intensity statin therapy (due to ASCVD risk >10%)    *Eye Exam (yearly): has never completed. New referral placed today    *Pneumococcal vaccination (PCV20): up to date    - Continued Metformin  - Discussed current medications, side effects and importance of compliance  - Reviewed most recent lab results with patient  - Emphasized importance of completing regular follow-ups as noted above  - Emphasized important of daily aerobic exercise  - Recommended low carb and low cholesterol diet  - Encouraged weight loss to goal BMI of <30  - Ordered CMP and Fasting lipid panel to be completed before next visit           Relevant Medications    atorvastatin (LIPITOR) 40 MG Tab    metFORMIN ER (GLUCOPHAGE XR) 500 MG TABLET SR 24 HR    Other Relevant Orders    Referral to Ophthalmology    CBC WITHOUT DIFFERENTIAL    Comp Metabolic Panel    Lipid Profile    Hyperlipidemia     Per prior labs.  - ordered repeat labs         Relevant Medications     atorvastatin (LIPITOR) 40 MG Tab    Obesity (BMI 30-39.9)     Patient reports gaining weight during recent orthopedic surgeries. He is motivated to make lifestyle changes.  - discussed diet changes and exercising at least 3 times per week  - offered nutrition consult, pt declined at this time         Relevant Medications    metFORMIN ER (GLUCOPHAGE XR) 500 MG TABLET SR 24 HR    Other male erectile dysfunction     Patient reports 2 year history of worsening difficulty with  initiating and maintaining erections during coitus. He reports only occasional morning erections and states that they are not 'full erections' during the mornings he does get them.  - discussed the importance of losing weight and having good control of T2DM and HLD  - discussed and agreed to check repeat labs before initiating any medical therapies  - f/u next appt          Other Visit Diagnoses       Encounter for hepatitis C screening test for low risk patient        Relevant Orders    HEP C VIRUS ANTIBODY            Orders Placed This Encounter    CBC WITHOUT DIFFERENTIAL    Comp Metabolic Panel    Lipid Profile    HEP C VIRUS ANTIBODY    Referral to Ophthalmology    atorvastatin (LIPITOR) 40 MG Tab    metFORMIN ER (GLUCOPHAGE XR) 500 MG TABLET SR 24 HR       Return in about 1 week (around 9/5/2023).        Wilma Joiner M.D.  Internal Medicine Resident  [employer] [facility] [employer]

## 2023-08-30 NOTE — ASSESSMENT & PLAN NOTE
Patient reports 2 year history of worsening difficulty with  initiating and maintaining erections during coitus. He reports only occasional morning erections and states that they are not 'full erections' during the mornings he does get them.  - discussed the importance of losing weight and having good control of T2DM and HLD  - discussed and agreed to check repeat labs before initiating any medical therapies  - f/u next appt

## 2023-08-30 NOTE — ASSESSMENT & PLAN NOTE
Patient with ICD-10 code: E11.9 Controlled type 2 Diabetes Mellitus. Most recent hemoglobin A1c was 6.2%. A1c goal is A1C GOAL: < 6.    - Reviewed routine follow-ups with patient:    *Hemoglobin A1C (q3 months): past due, new lab order placed today.    *Lipid panel (yearly): past due, new lab order placed today. and Since patient is 40-75 years old, he/she/they are currently on high-intensity statin therapy (due to ASCVD risk >10%)    *Eye Exam (yearly): has never completed. New referral placed today    *Pneumococcal vaccination (PCV20): up to date    - Continued Metformin  - Discussed current medications, side effects and importance of compliance  - Reviewed most recent lab results with patient  - Emphasized importance of completing regular follow-ups as noted above  - Emphasized important of daily aerobic exercise  - Recommended low carb and low cholesterol diet  - Encouraged weight loss to goal BMI of <30  - Ordered CMP and Fasting lipid panel to be completed before next visit

## 2023-08-30 NOTE — ASSESSMENT & PLAN NOTE
Patient reports gaining weight during recent orthopedic surgeries. He is motivated to make lifestyle changes.  - discussed diet changes and exercising at least 3 times per week  - offered nutrition consult, pt declined at this time

## 2023-09-05 ENCOUNTER — APPOINTMENT (OUTPATIENT)
Dept: INTERNAL MEDICINE | Facility: OTHER | Age: 58
End: 2023-09-05
Payer: COMMERCIAL

## 2023-12-05 ENCOUNTER — OFFICE VISIT (OUTPATIENT)
Dept: INTERNAL MEDICINE | Facility: OTHER | Age: 58
End: 2023-12-05
Payer: COMMERCIAL

## 2023-12-05 VITALS
HEIGHT: 67 IN | OXYGEN SATURATION: 95 % | BODY MASS INDEX: 38.45 KG/M2 | HEART RATE: 83 BPM | SYSTOLIC BLOOD PRESSURE: 138 MMHG | DIASTOLIC BLOOD PRESSURE: 77 MMHG | TEMPERATURE: 98.3 F | WEIGHT: 245 LBS

## 2023-12-05 DIAGNOSIS — Z09 ENCOUNTER FOR FOLLOW-UP EXAMINATION: ICD-10-CM

## 2023-12-05 DIAGNOSIS — N52.9 ERECTILE DYSFUNCTION, UNSPECIFIED ERECTILE DYSFUNCTION TYPE: ICD-10-CM

## 2023-12-05 DIAGNOSIS — E55.9 VITAMIN D DEFICIENCY: ICD-10-CM

## 2023-12-05 DIAGNOSIS — Z11.59 ENCOUNTER FOR HEPATITIS C SCREENING TEST FOR LOW RISK PATIENT: ICD-10-CM

## 2023-12-05 DIAGNOSIS — E66.9 OBESITY (BMI 30-39.9): ICD-10-CM

## 2023-12-05 DIAGNOSIS — Z23 ENCOUNTER FOR VACCINATION: ICD-10-CM

## 2023-12-05 DIAGNOSIS — Z12.11 SCREENING FOR COLORECTAL CANCER: ICD-10-CM

## 2023-12-05 DIAGNOSIS — E11.59 TYPE 2 DIABETES MELLITUS WITH OTHER CIRCULATORY COMPLICATION, WITHOUT LONG-TERM CURRENT USE OF INSULIN (HCC): ICD-10-CM

## 2023-12-05 DIAGNOSIS — E78.5 HYPERLIPIDEMIA, UNSPECIFIED HYPERLIPIDEMIA TYPE: ICD-10-CM

## 2023-12-05 DIAGNOSIS — Z12.12 SCREENING FOR COLORECTAL CANCER: ICD-10-CM

## 2023-12-05 LAB
HBA1C MFR BLD: 6.8 % (ref ?–5.8)
POCT INT CON NEG: NEGATIVE
POCT INT CON POS: POSITIVE

## 2023-12-05 PROCEDURE — 3078F DIAST BP <80 MM HG: CPT | Performed by: STUDENT IN AN ORGANIZED HEALTH CARE EDUCATION/TRAINING PROGRAM

## 2023-12-05 PROCEDURE — 90471 IMMUNIZATION ADMIN: CPT | Performed by: STUDENT IN AN ORGANIZED HEALTH CARE EDUCATION/TRAINING PROGRAM

## 2023-12-05 PROCEDURE — 99213 OFFICE O/P EST LOW 20 MIN: CPT | Mod: 25,GE | Performed by: STUDENT IN AN ORGANIZED HEALTH CARE EDUCATION/TRAINING PROGRAM

## 2023-12-05 PROCEDURE — 3075F SYST BP GE 130 - 139MM HG: CPT | Performed by: STUDENT IN AN ORGANIZED HEALTH CARE EDUCATION/TRAINING PROGRAM

## 2023-12-05 PROCEDURE — 83036 HEMOGLOBIN GLYCOSYLATED A1C: CPT | Performed by: STUDENT IN AN ORGANIZED HEALTH CARE EDUCATION/TRAINING PROGRAM

## 2023-12-05 PROCEDURE — 90686 IIV4 VACC NO PRSV 0.5 ML IM: CPT | Performed by: STUDENT IN AN ORGANIZED HEALTH CARE EDUCATION/TRAINING PROGRAM

## 2023-12-05 RX ORDER — ROSUVASTATIN CALCIUM 40 MG/1
40 TABLET, COATED ORAL DAILY
Qty: 30 TABLET | Refills: 3 | Status: SHIPPED | OUTPATIENT
Start: 2023-12-05

## 2023-12-05 RX ORDER — ATORVASTATIN CALCIUM 40 MG/1
40 TABLET, FILM COATED ORAL NIGHTLY
Qty: 30 TABLET | Refills: 1 | Status: SHIPPED | OUTPATIENT
Start: 2023-12-05 | End: 2023-12-05 | Stop reason: SINTOL

## 2023-12-05 RX ORDER — METFORMIN HYDROCHLORIDE 500 MG/1
1000 TABLET, EXTENDED RELEASE ORAL 2 TIMES DAILY
Qty: 360 TABLET | Refills: 0 | Status: SHIPPED | OUTPATIENT
Start: 2023-12-05 | End: 2024-03-04

## 2023-12-05 RX ORDER — METFORMIN HYDROCHLORIDE 500 MG/1
1000 TABLET, EXTENDED RELEASE ORAL DAILY
Qty: 60 TABLET | Refills: 1 | Status: SHIPPED | OUTPATIENT
Start: 2023-12-05 | End: 2023-12-05

## 2023-12-05 ASSESSMENT — FIBROSIS 4 INDEX: FIB4 SCORE: 0.48

## 2023-12-05 NOTE — PATIENT INSTRUCTIONS
Por favor, patrick para programar ashu lon:    [facility]  [address] [place]  [address]  Phone: [phone]    [facility]  [address] [address]  Phone: [phone]  Fax: [phone]    - Please have lab work collected, when able.   - Follow-up in clinic in 3 months, or as needed.

## 2023-12-05 NOTE — PROGRESS NOTES
"      Established Patient    Yoni presents today with the following:    CC: Lab Results    HPI:     Mr. Esthela Conklin is a 58 year-old male who presents to [facility] for a follow-up visit.  Today, he reports that he feels well.     He had lab work collected on September 6th:  Lipid profile:   H   H  HDL 81    Fasting  H  BUN 29 H  BUN/Cr 32 H  CBC WNL  Hep c Ab non-reactive    POC A1c 6.8%    He is currently on Lipitor 40 mg and Metformin XR 1000 mg (500 mg BID).    Patient Active Problem List    Diagnosis Date Noted    Other male erectile dysfunction 08/29/2023    Obesity (BMI 30-39.9) 02/08/2023    Hyperlipidemia 10/02/2019    Type 2 diabetes mellitus, without long-term current use of insulin ([facility]) 09/25/2019     Social History     Tobacco Use    Smoking status: Never    Smokeless tobacco: Never   Vaping Use    Vaping Use: Never used   Substance Use Topics    Alcohol use: Not Currently     Comment: 3/wk    Drug use: No     Current Outpatient Medications   Medication Sig Dispense Refill    metFORMIN ER (GLUCOPHAGE XR) 500 MG TABLET SR 24 HR Take 2 Tablets by mouth 2 times a day for 90 days. 360 Tablet 0    rosuvastatin (CRESTOR) 40 MG tablet Take 1 Tablet by mouth every day. 30 Tablet 3     No current facility-administered medications for this visit.     Review of Systems   Constitutional: Negative.    HENT: Negative.     Eyes: Negative.    Respiratory: Negative.     Cardiovascular: Negative.    Gastrointestinal: Negative.    Genitourinary: Negative.    Musculoskeletal: Negative.    Skin: Negative.    Neurological: Negative.    Psychiatric/Behavioral: Negative.       /77 (BP Location: Left arm, Patient Position: Sitting, BP Cuff Size: Adult)   Pulse 83   Temp 36.8 °C (98.3 °F) (Temporal)   Ht 1.702 m (5' 7\")   Wt 111 kg (245 lb)   SpO2 95%   BMI 38.37 kg/m²     Physical Exam  Vitals reviewed.   Constitutional:       Appearance: He is obese.   HENT:      Head: " Normocephalic.      Nose: Nose normal.      Mouth/Throat:      Mouth: Mucous membranes are moist.      Pharynx: Oropharynx is clear.   Eyes:      Conjunctiva/sclera: Conjunctivae normal.      Pupils: Pupils are equal, round, and reactive to light.   Cardiovascular:      Rate and Rhythm: Normal rate and regular rhythm.      Pulses: Normal pulses.      Heart sounds: Normal heart sounds.   Pulmonary:      Effort: Pulmonary effort is normal.      Breath sounds: Normal breath sounds.   Abdominal:      General: Bowel sounds are normal.      Palpations: Abdomen is soft.   Musculoskeletal:         General: Normal range of motion.      Cervical back: Neck supple.   Skin:     General: Skin is warm.      Capillary Refill: Capillary refill takes less than 2 seconds.   Neurological:      General: No focal deficit present.      Mental Status: He is alert and oriented to person, place, and time. Mental status is at baseline.   Psychiatric:         Mood and Affect: Mood normal.       Note: I have reviewed all pertinent labs and diagnostic tests associated with this visit with specific comments listed under the assessment and plan below    Assessment and Plan    Mr. Esthela Conklin is a 58 year-old male who presents to [facility] for a follow-up visit.    1. Encounter for follow-up examination  Yoni presents to the clinic today for follow-up and to discuss recent lab results.  - Labs were discussed in detail at today's visit.  - Additional labs were ordered today.  - Influenza vaccination provided today.   - Medications were adjusted today.   - Follow-up in 3 months, or as needed.     2. Type 2 diabetes mellitus with other circulatory complication, without long-term current use of insulin ([facility])  - Microalbumin Creat Ratio Urine - Lab Collect; Future  - POCT  A1C increased to 6.8%  - metFORMIN ER (GLUCOPHAGE XR) 500 MG TABLET SR 24 HR; Take 2 Tablets by mouth 2 times a day for 90 days.  Dispense: 360 Tablet; Refill: 0  - Diet and  exercise counseling provided at today's visit.   - Will need LE monofilament testing at next encounter.  - Will also need retinal POCT screening if he was unable to establish with an ophthalmologist.     3. Hyperlipidemia, unspecified hyperlipidemia type  Developed a prior cutaneous adverse reaction to Lipitor, thus it was discontinued. Will trial another statin to assess tolerance, as he needs to be on a moderate to high intensity statin.   - rosuvastatin (CRESTOR) 40 MG tablet; Take 1 Tablet by mouth every day.  Dispense: 30 Tablet; Refill: 3    4. Obesity (BMI 30-39.9)  - TSH WITH REFLEX TO FT4; Future  - rosuvastatin (CRESTOR) 40 MG tablet; Take 1 Tablet by mouth every day.  Dispense: 30 Tablet; Refill: 3    5. Erectile dysfunction, unspecified erectile dysfunction type  - TESTOSTERONE SERUM; Future    6. Vitamin D deficiency  - VITAMIN D,25 HYDROXY (DEFICIENCY); Future    7. Encounter for vaccination  - Influenza Vaccine Quad Injection (PF)    8. Screening for colorectal cancer  - Referral to GI for Colonoscopy placed at today's visit.     Followup: Return in about 3 months (around 3/5/2024), or if symptoms worsen or fail to improve.    Signed by:     Sharda Mandujano MD  Internal Medicine PGY-3

## 2023-12-07 ASSESSMENT — ENCOUNTER SYMPTOMS
GASTROINTESTINAL NEGATIVE: 1
CARDIOVASCULAR NEGATIVE: 1
CONSTITUTIONAL NEGATIVE: 1
NEUROLOGICAL NEGATIVE: 1
RESPIRATORY NEGATIVE: 1
EYES NEGATIVE: 1
PSYCHIATRIC NEGATIVE: 1
MUSCULOSKELETAL NEGATIVE: 1

## 2023-12-12 ENCOUNTER — TELEPHONE (OUTPATIENT)
Dept: INTERNAL MEDICINE | Facility: OTHER | Age: 58
End: 2023-12-12
Payer: COMMERCIAL

## 2023-12-12 NOTE — TELEPHONE ENCOUNTER
Patient called asking for the information of the ophthalmologist. Gave him the information on file     [facility] [address] [address]  Phone: [phone]  Fax: [phone]

## 2024-03-04 ENCOUNTER — TELEPHONE (OUTPATIENT)
Dept: INTERNAL MEDICINE | Facility: OTHER | Age: 59
End: 2024-03-04

## 2024-03-04 NOTE — TELEPHONE ENCOUNTER
Patient called to inform us about his new insurance after speaking with patient we found out his new insurance is no longer accepted at our clinic patient is unable to pay self pay but would like the his doctor to tell him how his labs where  and if she can refill any medications while he re-establishes care somewhere else.

## 2024-03-05 ENCOUNTER — APPOINTMENT (OUTPATIENT)
Dept: INTERNAL MEDICINE | Facility: OTHER | Age: 59
End: 2024-03-05

## 2024-03-05 NOTE — TELEPHONE ENCOUNTER
Patient has been inform that we received notice that his plan is now accepted with [facility] and If he would like to set up an appointment. He said he will call us back later to set that up since he did find another provider but wants to see how his first visit goes before making another appointment with our clinic.

## 2024-05-28 ENCOUNTER — OFFICE VISIT (OUTPATIENT)
Dept: INTERNAL MEDICINE | Facility: OTHER | Age: 59
End: 2024-05-28
Payer: COMMERCIAL

## 2024-05-28 VITALS
HEART RATE: 78 BPM | SYSTOLIC BLOOD PRESSURE: 112 MMHG | WEIGHT: 258.2 LBS | DIASTOLIC BLOOD PRESSURE: 70 MMHG | TEMPERATURE: 98.5 F | BODY MASS INDEX: 40.53 KG/M2 | HEIGHT: 67 IN | OXYGEN SATURATION: 95 %

## 2024-05-28 DIAGNOSIS — E55.9 VITAMIN D DEFICIENCY: ICD-10-CM

## 2024-05-28 DIAGNOSIS — B35.1 ONYCHOMYCOSIS: ICD-10-CM

## 2024-05-28 DIAGNOSIS — I10 PRIMARY HYPERTENSION: ICD-10-CM

## 2024-05-28 DIAGNOSIS — G47.30 SLEEP APNEA, UNSPECIFIED TYPE: ICD-10-CM

## 2024-05-28 DIAGNOSIS — N52.9 ERECTILE DYSFUNCTION, UNSPECIFIED ERECTILE DYSFUNCTION TYPE: ICD-10-CM

## 2024-05-28 DIAGNOSIS — Q53.13 UNILATERAL HIGH SCROTAL TESTICLE: ICD-10-CM

## 2024-05-28 DIAGNOSIS — E66.9 OBESITY (BMI 30-39.9): ICD-10-CM

## 2024-05-28 DIAGNOSIS — E11.59 TYPE 2 DIABETES MELLITUS WITH OTHER CIRCULATORY COMPLICATION, WITHOUT LONG-TERM CURRENT USE OF INSULIN (HCC): ICD-10-CM

## 2024-05-28 RX ORDER — SILDENAFIL 50 MG/1
50 TABLET, FILM COATED ORAL
Qty: 30 TABLET | Refills: 1 | Status: SHIPPED | OUTPATIENT
Start: 2024-05-28

## 2024-05-28 RX ORDER — OLMESARTAN MEDOXOMIL 20 MG/1
20 TABLET ORAL DAILY
COMMUNITY
Start: 2024-05-02

## 2024-05-28 RX ORDER — HYDROCHLOROTHIAZIDE 25 MG/1
25 TABLET ORAL DAILY
COMMUNITY
Start: 2024-05-02

## 2024-05-28 RX ORDER — OMEGA-3S/DHA/EPA/FISH OIL/D3 300MG-1000
400 CAPSULE ORAL DAILY
COMMUNITY
Start: 2024-05-02

## 2024-05-28 ASSESSMENT — PATIENT HEALTH QUESTIONNAIRE - PHQ9: CLINICAL INTERPRETATION OF PHQ2 SCORE: 0

## 2024-05-28 ASSESSMENT — FIBROSIS 4 INDEX: FIB4 SCORE: 0.48

## 2024-05-28 NOTE — PATIENT INSTRUCTIONS
-Referral to sleep medicine por sleep apnea evaluation. Llame para hacer ashu lon.   -Applicar cada rocky a la ashu (nail) affected por 48 semanas.   -Utilice 1 tableta de viagara 4 horas antes de tener relaciones sexuales. No utilizar mas de ashu vez al rocky.  -Referral to urology por evaluation de la testicle.   -Fue agradable visitarte hoy

## 2024-05-28 NOTE — PROGRESS NOTES
Teaching Physician Attestation      Level of Participation    I have personally interviewed and examined the patient.  In addition, I discussed with the resident physician the patient's history, exam, assessment and plan in detail.  Topics listed in my addendum were the focus of the visit.  Healthcare maintenance was not addressed this visit unless listed as a topic in my addendum.  I agree with the plan as written along with the following additions/modifications:      Erectile dysfunction  -denies depressoin/anxiety issues to .  Initially stated no spontaneous erections and no ability to get an erection at all, subsequently clarified when I was in the room that he does have spontaneous erections on a weekly basis in the mornings and also reports ability to obtain a partial erection during sexual activity.  Denies any trauma, dysuria, discharge.  On exam by  that I chaperoned patient has normal sized testicles although the right testicle is substantially higher than the left, patient denies any pain.  Denies chest pain or shortness of breath, but does score high on STOP-BANG.  With exception of mild nc anemia, recent cbc nl, tsh and testosterone also nl.  Of note does have dm.      Plan  -Discussed possibility of trial of intro dose of Viagra at 50 mg daily if patient is interested, alarm precautions including lightheadedness, chest pain, shortness of breath reviewed, also reviewed contraindication to nitrates.  -Given right testicle is significantly raised against the right pelvis also discussed possibility of urology referral, plan per resident note  -Sleep medicine referral for AUSTYN screening, appreciate support  -Patient would benefit from weight reduction given estrogenizing effects of lipid tissue, also treatment of dm.  Would benefit from cmp next lab draw for ED and DM2/bmi 40  -Follow-up 1 month    Onychomycosis of right finger  -mild.  Trial ciclopirox qhs, wash hands before eating in  am.    Return to clinic 1 month. Needs dedicated dm f/u. PCR.

## 2024-05-29 PROBLEM — I10 PRIMARY HYPERTENSION: Status: ACTIVE | Noted: 2024-05-29

## 2024-05-29 NOTE — PROGRESS NOTES
Established Patient    Patient Care Team:  Wilma Joiner M.D. as PCP - General (Internal Medicine)    Yoni Conklin is a 58 y.o. male who presents today with the following Chief Complaint(s): Follow up for Diagnoses of Erectile dysfunction, unspecified erectile dysfunction type, Sleep apnea, unspecified type, Obesity (BMI 30-39.9), Unilateral high scrotal testicle, and Onychomycosis were pertinent to this visit.    HPI:  Yoni Conklin is a 58 y.o. male with past medical history of Type 2 DM, obesity, hypertension and hyperlipidemia who presents to the clinic today for follow-up.  was used.     Labs 5/28/2024:   Testosterone 408  TSH 1.26  Vitamin D 20  Hepatitis C nonreactive  Microalbumin creatinine ratio 12    Patient requesting testosterone therapy due to erectile dysfunction.  Denies any reduced sex drive, fatigue, hot flashes, muscle weakness, weight gain, loss of body hair, depressed mood, anxiety or small testicles.  Discussed with patient that his testosterone is within normal limits and does not require testosterone replacement.  Has noticed trouble getting erections in the past 3 months.  Initially patient stated he did not notice erections in the morning and was unable to get an erection with his partner, but later stated he was noticing morning erections about every 2-3 days and able to get soft erections.  Has difficulty maintaining an erection, however no decrease in sexual drive.  Denies any anxiety or depression. BMI is 40.44 and he has other medical comorbidities which include Type 2 diabetes, hypertension and hyperlipidemia. No heart disease. No tobacco or alcohol use. No trauma to the groin area. Does report snoring and reports his wife has noticed periods where he has difficulty breathing. STOP-BANG score 7.      Also, patient concerned about a misshaped fingernail on his right hand that is discolored. Has been keeping the area cleaned but it has  "not gone away. Does mention at times he will pick at his toenails.       ROS:     General: No fevers, chills, night sweats, weight loss or gain  HEENT: No hearing changes, vision changes, eye pain, ear pain, nasal discharge, sore throat  Neck: No swelling in neck  Pulmonary: No shortness of breath, cough, sputum, or hemoptysis  Cardiovascular: No chest pain, palpitations, or LE swelling  GI: No nausea, vomiting, diarrhea, constipation, abdominal pain, hematochezia or melena  : No dysuria or frequency  Neuro: No focal weakness, no general weakness, no headaches, no lightheadedness, no dizziness  Psych: No anxiety or depression    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 ([facility]) 09/26/2019    Resolved s/p left SAMANTHA (2019)    History of Inguinal hernia 07/11/2018    History of Umbilical hernia 07/11/2018    Nontraumatic rupture of right proximal biceps tendon 10/19/2022    Resolved s/p surgery 2022    Obesity (BMI 30-39.9) 02/08/2023    Other male erectile dysfunction 08/29/2023    RA (rheumatoid arthritis) ([facility]) 07/11/2018    \"Leg\"    Rotator cuff tear 10/19/2022    Resolved s/p surgery 2022    Type 2 diabetes mellitus      Social History     Tobacco Use    Smoking status: Never    Smokeless tobacco: Never   Vaping Use    Vaping status: Never Used   Substance Use Topics    Alcohol use: Not Currently     Comment: 3/wk    Drug use: No     Current Outpatient Medications   Medication Sig Dispense Refill    olmesartan (BENICAR) 20 MG Tab Take 20 mg by mouth every day.      hydroCHLOROthiazide 25 MG Tab Take 25 mg by mouth every day.      vitamin D3, cholecalciferol, 400 UNIT Tab tablet Take 400 Units by mouth every day.      Efinaconazole 10 % Solution Apply 1 Application topically every day. 8 mL 1    sildenafil citrate (VIAGRA) 50 MG tablet Take 1 Tablet by mouth 1 time a day as needed for Erectile Dysfunction. 30 Tablet 1    rosuvastatin (CRESTOR) 40 MG tablet Take 1 Tablet by mouth " "every day. 30 Tablet 3     No current facility-administered medications for this visit.       Physical Exam:  /70 (BP Location: Left arm, Patient Position: Sitting, BP Cuff Size: Large adult)   Pulse 78   Temp 36.9 °C (98.5 °F) (Temporal)   Ht 1.702 m (5' 7\")   Wt 117 kg (258 lb 3.2 oz)   SpO2 95%   BMI 40.44 kg/m²   General: Well developed, well nourished male, in no distress.  HEENT: NC/AT, PERRL, EOMI, no scleral icterus or conjunctival pallor, fair dentition  Neck: Supple, No cervical or supraclavicular LAD  CV:RRR, no murmurs gallops or Rubs  Pulm: LCAB, no crackles, rales, rhonchi, or wheezing  GI: Normal bowel sounds, abdomen soft, nontender, distended (obese) to deep or light palpation in all 4 quadrants, no HSM.  : Testicles are normal size, however right testicle is higher than left testicle.  No palpable masses.  MSK: Strength 5 out of 5 in upper and lower extremities.    Neuro: Patient is alert and oriented x3, no focal deficits  Psych: Appropriate mood and affect       Assessment and Plan:   1. Erectile dysfunction, unspecified erectile dysfunction type  Difficulty getting a full erection and sustaining one.  No decrease in sex drive.  Patient has multiple comorbidities that could contribute to ED.  Discussed managing weight with diet and exercise  - Trial Viagara 50 mg. Discussed with patient to use at least 4 hours prior to sexual intercourse with maximum once daily use. Avoid nitrates. If any chest pain, shortness of breath or erection lasting longer than 4 hours, instructed to go to the ER. Handout provided.   - Follow-up in 6 weeks     2. Sleep apnea, unspecified type  STOP-BANG score 7. High risk for moderate to severe AUSTYN.   Discussed with patient referral to sleep medicine for sleep study as untreated AUSTYN could contribute to erectile dysfunction. Patient agreeable.   - Referral to Pulmonary and Sleep Medicine    3. Obesity (BMI 30-39.9)  BMI 40.44 with multiple comorbidities "   Discussed with patient eating more fruits and vegetables in diet  and exercise for at least 30 minutes a day as being obese can contribute to erectile dysfunction.   - Comp Metabolic Panel; Future  - Follow-up in 4 weeks     4. Unilateral high scrotal testicle  On physical exam, right testicle higher than left.   Patient reports this has been present all his life with no issues.  Discussed referral to urology for evaluation. Patient agreeable.   - Referral to Urology    5. Onychomycosis  On physical exam, has trauma to nail bed and yellowing of 3rd digit on right hand.  Discussed with patient to keep nails clean, use clean nail clippers, avoid picking at toe nails. Handout provided.   Prescribed topical ciclopirox 8% solution to use at nighttime.   -Follow-up in 4 weeks     Return in about 4 weeks (around 6/25/2024) for Short.    Patient Instructions   -Referral to sleep medicine por sleep apnea evaluation. Llame para hacer ashu lon.   -Applicar cada rocky a la ashu (nail) affected por 48 semanas.   -Utilice 1 tableta de viagara 4 horas antes de tener relaciones sexuales. No utilizar mas de ashu vez al rocky.  -Referral to urology por evaluation de la testicle.   -Fue agradable visitarte hermelindo Garcia M.D. PGY-3  [employer]    This note was created using voice recognition software.  While every attempt is made to ensure accuracy of transcription, occasionally errors occur.

## 2024-05-31 RX ORDER — CICLOPIROX 80 MG/ML
1 SOLUTION TOPICAL NIGHTLY
Qty: 6.6 ML | Refills: 3 | Status: SHIPPED | OUTPATIENT
Start: 2024-05-31

## 2024-06-18 ENCOUNTER — OFFICE VISIT (OUTPATIENT)
Dept: INTERNAL MEDICINE | Facility: OTHER | Age: 59
End: 2024-06-18
Payer: COMMERCIAL

## 2024-06-18 VITALS
OXYGEN SATURATION: 95 % | BODY MASS INDEX: 40.76 KG/M2 | DIASTOLIC BLOOD PRESSURE: 73 MMHG | HEIGHT: 66 IN | TEMPERATURE: 97.2 F | SYSTOLIC BLOOD PRESSURE: 123 MMHG | WEIGHT: 253.6 LBS | HEART RATE: 69 BPM

## 2024-06-18 DIAGNOSIS — I10 PRIMARY HYPERTENSION: ICD-10-CM

## 2024-06-18 DIAGNOSIS — E11.59 TYPE 2 DIABETES MELLITUS WITH OTHER CIRCULATORY COMPLICATION, WITHOUT LONG-TERM CURRENT USE OF INSULIN (HCC): ICD-10-CM

## 2024-06-18 DIAGNOSIS — N52.8 OTHER MALE ERECTILE DYSFUNCTION: ICD-10-CM

## 2024-06-18 DIAGNOSIS — E78.5 HYPERLIPIDEMIA, UNSPECIFIED HYPERLIPIDEMIA TYPE: ICD-10-CM

## 2024-06-18 DIAGNOSIS — Z13.228 SCREENING FOR METABOLIC DISORDER: ICD-10-CM

## 2024-06-18 PROCEDURE — 99213 OFFICE O/P EST LOW 20 MIN: CPT | Mod: GE

## 2024-06-18 PROCEDURE — 3078F DIAST BP <80 MM HG: CPT | Mod: GE

## 2024-06-18 PROCEDURE — 3074F SYST BP LT 130 MM HG: CPT | Mod: GE

## 2024-06-18 ASSESSMENT — ENCOUNTER SYMPTOMS
WHEEZING: 0
EYE REDNESS: 0
VOMITING: 0
SHORTNESS OF BREATH: 0
FALLS: 0
FEVER: 0
ABDOMINAL PAIN: 0
WEAKNESS: 0
CHILLS: 0

## 2024-06-18 ASSESSMENT — FIBROSIS 4 INDEX: FIB4 SCORE: 0.48

## 2024-06-18 NOTE — ASSESSMENT & PLAN NOTE
Patient with ICD-10 code: E11.9 Controlled type 2 Diabetes Mellitus. Most recent hemoglobin A1c was 6.8% (12/2023. A1c goal is A1C GOAL: < 7.    - Reviewed routine follow-ups with patient:    *Monofilament test (each visit): completed today with normal findings    *Hemoglobin A1C (q3 months): past due, new lab order placed today.    *Lipid panel (yearly): up-to-date, completed September/2023.    *Urine micro-albumin (yearly): up-to-date, completed February/2024    *Podiatry Exam (yearly): n/a. Normal monofilament exam.    *Eye Exam (yearly): past due. Patient has previously been referred, follow-up recommended as soon as possible    *Pneumococcal vaccination (PCV20): up-to-date, completed May/2023.    - Discussed current medications, side effects and importance of compliance  - Reviewed most recent lab results with patient  - Emphasized importance of completing regular follow-ups as noted above  - Emphasized importance of daily foot exams and foot care  - Ordered CMP, Hemoglobin A1C, and Fasting lipid panel to be completed before next visit

## 2024-06-18 NOTE — PROGRESS NOTES
"    ESTABLISHED PATIENT VISIT    PATIENT ID:  Name: Yoni Mckeon    YOB: 1965  Patient Care Team:  Wilma Joiner M.D. as PCP - General (Internal Medicine)    CHIEF COMPLAINT(s):  Follow-Up (Pt reports no new symptoms and that he has been doing okay since last visit)    Follow up for Diagnoses of Type 2 diabetes mellitus with other circulatory complication, without long-term current use of insulin ([facility]), Hyperlipidemia, unspecified hyperlipidemia type, Screening for metabolic disorder, Primary hypertension, and Other male erectile dysfunction were pertinent to this visit.    History of Present Illness:    This is a pleasant 58 y.o. male clinic patient established with me who presents today for follow-up. Denies any acute complaints this visit. We discussed diabetes topics today.  via ipad was used.      Review of Systems   Constitutional:  Negative for chills and fever.   HENT:  Negative for congestion.    Eyes:  Negative for redness.   Respiratory:  Negative for shortness of breath and wheezing.    Cardiovascular:  Negative for chest pain.   Gastrointestinal:  Negative for abdominal pain and vomiting.   Musculoskeletal:  Negative for falls.   Neurological:  Negative for weakness.       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 ([facility]) 09/26/2019    Resolved s/p left SAMANTHA (2019)    History of Inguinal hernia 07/11/2018    History of Umbilical hernia 07/11/2018    Nontraumatic rupture of right proximal biceps tendon 10/19/2022    Resolved s/p surgery 2022    Obesity (BMI 30-39.9) 02/08/2023    Other male erectile dysfunction 08/29/2023    RA (rheumatoid arthritis) ([facility]) 07/11/2018    \"Leg\"    Rotator cuff tear 10/19/2022    Resolved s/p surgery 2022    Type 2 diabetes mellitus      Past Surgical History:   Procedure Laterality Date    PB INCIS/DRAIN SHLDR ABSC/HEMA,DEEP Left 01/20/2023    Procedure: LEFT SHOULDER, INCISION AND DRAINAGE, " SHOULDER REGION;  Surgeon: Ricardo Montgomrey M.D.;  Location: [facility];  Service: Orthopedics    PB SHLDR ARTHROSCOP,SURG,W/ROTAT CUFF REPB  11/22/2022    Procedure: Left shoulder scope rotator cuff repair, biceps tenodesis, subacromial decompression,with extensive debridement, distal clavicle resection and repairs as indicated;  Surgeon: Ricardo Montgomery M.D.;  Location: [facility];  Service: Orthopedics    PB ARTHROSCOPY SHOULDER SURGICAL BICEPS TENODES*  11/22/2022    Procedure: ARTHROSCOPY, SHOULDER, WITH BICEPS TENODESIS;  Surgeon: Ricardo Montgomery M.D.;  Location: [facility];  Service: Orthopedics    WI SHLDR ARTHROSCOP,PART ACROMIOPLAS  11/22/2022    Procedure: DECOMPRESSION, SHOULDER, ARTHROSCOPIC;  Surgeon: Ricardo Montgomery M.D.;  Location: [facility];  Service: Orthopedics    WI SHLDR ARTHROSCOP EXTEN DEBRIDE 3+  11/22/2022    Procedure: ARTHROSCOPY, SHOULDER, WITH EXTENSIVE DEBRIDEMENT;  Surgeon: Ricardo Montgomery M.D.;  Location: [facility];  Service: Orthopedics    CLAVICLE DISTAL EXCISION  11/22/2022    Procedure: EXCISION, CLAVICLE, DISTAL;  Surgeon: Ricardo Montgomery M.D.;  Location: [facility];  Service: Orthopedics    HIP ARTHROPLASTY TOTAL Right 2020    WI TOTAL HIP ARTHROPLASTY Left 09/25/2019    Procedure: ARTHROPLASTY, HIP, TOTAL;  Surgeon: Nahid Currie M.D.;  Location: SURGERY [facility];  Service: Orthopedics    INGUINAL HERNIA REPAIR Left 07/18/2018    Procedure: INGUINAL HERNIA REPAIR;  Surgeon: J Carlos Myers M.D.;  Location: SURGERY SAME DAY [facility];  Service: General    UMBILICAL HERNIA REPAIR N/A 07/18/2018    Procedure: UMBILICAL HERNIA REPAIR;  Surgeon: J Carlos Myers M.D.;  Location: SURGERY SAME DAY [facility];  Service: General    OTHER ORTHOPEDIC SURGERY  2010    Meniscus Knee Surgery Left Knee 2010 & Right Knee 2004     No family  "history on file.  Patient has no known allergies.  Social History     Tobacco Use    Smoking status: Never    Smokeless tobacco: Never   Vaping Use    Vaping status: Never Used   Substance Use Topics    Alcohol use: Not Currently     Comment: 3/wk    Drug use: No     Current Outpatient Medications   Medication Sig Dispense Refill    ciclopirox (PENLAC) 8 % solution Apply 1 Application topically every evening. 6.6 mL 3    olmesartan (BENICAR) 20 MG Tab Take 20 mg by mouth every day.      hydroCHLOROthiazide 25 MG Tab Take 25 mg by mouth every day.      vitamin D3, cholecalciferol, 400 UNIT Tab tablet Take 400 Units by mouth every day.      sildenafil citrate (VIAGRA) 50 MG tablet Take 1 Tablet by mouth 1 time a day as needed for Erectile Dysfunction. 30 Tablet 1    rosuvastatin (CRESTOR) 40 MG tablet Take 1 Tablet by mouth every day. 30 Tablet 3     No current facility-administered medications for this visit.       OBJECTIVE:  /73   Pulse 69   Temp 36.2 °C (97.2 °F) (Temporal)   Ht 1.676 m (5' 6\")   Wt 115 kg (253 lb 9.6 oz)   SpO2 95%   BMI 40.93 kg/m²   Physical Exam  Vitals reviewed.   Constitutional:       General: He is not in acute distress.     Appearance: Normal appearance. He is obese. He is not ill-appearing.   HENT:      Head: Normocephalic.      Nose: Nose normal.      Mouth/Throat:      Mouth: Mucous membranes are moist.      Pharynx: Oropharynx is clear.   Eyes:      General: No scleral icterus.     Conjunctiva/sclera: Conjunctivae normal.   Cardiovascular:      Rate and Rhythm: Normal rate.      Pulses: Normal pulses.      Heart sounds: Normal heart sounds. No murmur heard.  Pulmonary:      Effort: Pulmonary effort is normal. No respiratory distress.      Breath sounds: Normal breath sounds.   Abdominal:      General: There is distension (secondary to body habitus).      Tenderness: There is no abdominal tenderness.   Musculoskeletal:         General: No tenderness. Normal range of motion. "      Cervical back: Normal range of motion.      Right lower leg: No edema.      Left lower leg: No edema.   Skin:     General: Skin is warm and dry.      Coloration: Skin is not jaundiced or pale.   Neurological:      General: No focal deficit present.      Mental Status: He is alert and oriented to person, place, and time. Mental status is at baseline.      Motor: No weakness.      Gait: Gait normal.   Psychiatric:         Mood and Affect: Mood normal.         Behavior: Behavior normal.         Thought Content: Thought content normal.         ASSESSMENT AND PLAN:     Problem List Items Addressed This Visit       Other male erectile dysfunction     Reports viagra has been working well. Denies experiencing any red flag symptoms.         Primary hypertension     Well controlled on current regimen  123/73 today  - continuue arb and hctz         Type 2 diabetes mellitus, without long-term current use of insulin ([facility])     Patient with ICD-10 code: E11.9 Controlled type 2 Diabetes Mellitus. Most recent hemoglobin A1c was 6.8% (12/2023. A1c goal is A1C GOAL: < 7.    - Reviewed routine follow-ups with patient:    *Monofilament test (each visit): completed today with normal findings    *Hemoglobin A1C (q3 months): past due, new lab order placed today.    *Lipid panel (yearly): up-to-date, completed September/2023.    *Urine micro-albumin (yearly): up-to-date, completed February/2024    *Podiatry Exam (yearly): n/a. Normal monofilament exam.    *Eye Exam (yearly): past due. Patient has previously been referred, follow-up recommended as soon as possible    *Pneumococcal vaccination (PCV20): up-to-date, completed May/2023.    - Discussed current medications, side effects and importance of compliance  - Reviewed most recent lab results with patient  - Emphasized importance of completing regular follow-ups as noted above  - Emphasized importance of daily foot exams and foot care  - Ordered CMP, Hemoglobin A1C, and Fasting lipid  panel to be completed before next visit           Relevant Orders    Lipid Profile    HEMOGLOBIN A1C    Diabetic Monofilament LE Exam (Completed)    Comp Metabolic Panel    Hyperlipidemia    Relevant Orders    Lipid Profile     Other Visit Diagnoses       Screening for metabolic disorder        Relevant Orders    Comp Metabolic Panel            Orders Placed This Encounter    Diabetic Monofilament LE Exam    Lipid Profile    HEMOGLOBIN A1C    Comp Metabolic Panel       Return in about 3 months (around 9/18/2024).      Wilma Joiner M.D.  [facility] Internal Medicine Resident

## 2024-06-18 NOTE — PATIENT INSTRUCTIONS
EYE DOCTOR informacion:    [facility]  [address] [place] [id number]  Phone: [phone]  Fax: [phone]

## 2024-09-11 ENCOUNTER — APPOINTMENT (OUTPATIENT)
Dept: INTERNAL MEDICINE | Facility: OTHER | Age: 59
End: 2024-09-11
Payer: COMMERCIAL

## 2024-10-21 ENCOUNTER — APPOINTMENT (OUTPATIENT)
Dept: INTERNAL MEDICINE | Facility: OTHER | Age: 59
End: 2024-10-21
Payer: COMMERCIAL

## 2024-11-13 ENCOUNTER — APPOINTMENT (OUTPATIENT)
Dept: SLEEP MEDICINE | Facility: MEDICAL CENTER | Age: 59
End: 2024-11-13
Payer: COMMERCIAL

## (undated) DEVICE — SUTURE 3-0 VICRYL PLUS SH - 8X 18 INCH (12/BX)

## (undated) DEVICE — HUMID-VENT HEAT AND MOISTURE EXCHANGE- (50/BX)

## (undated) DEVICE — TUBE CONNECTING SUCTION - CLEAR PLASTIC STERILE 72 IN (50EA/CA)

## (undated) DEVICE — SUTURE 5 ETHIBOND V-37 (12PK/BX)

## (undated) DEVICE — NEPTUNE 4 PORT MANIFOLD - (20/PK)

## (undated) DEVICE — ELECTRODE 850 FOAM ADHESIVE - HYDROGEL RADIOTRNSPRNT (50/PK)

## (undated) DEVICE — SUTURE GENERAL

## (undated) DEVICE — KIT  I.V. START (100EA/CA)

## (undated) DEVICE — PAD UNIVERSAL MULTI USE (1/EA)

## (undated) DEVICE — KIT ANESTHESIA W/CIRCUIT & 3/LT BAG W/FILTER (20EA/CA)

## (undated) DEVICE — PAD BABY LAP 4X18 W/O - RINGS PREWASHED 5/PK 40PK/CS

## (undated) DEVICE — SET EXTENSION WITH 2 PORTS (48EA/CA) ***PART #2C8610 IS A SUBSTITUTE*****

## (undated) DEVICE — SODIUM CHL. IRRIGATION 0.9% 3000ML (4EA/CA 65CA/PF)

## (undated) DEVICE — CANISTER SUCTION RIGID RED 1500CC (40EA/CA)

## (undated) DEVICE — PACK TOTAL HIP - (1/CA)

## (undated) DEVICE — SODIUM CHL IRRIGATION 0.9% 1000ML (12EA/CA)

## (undated) DEVICE — SUTURE 3-0 MONOCRYL PLUS PS-1 - 27 INCH (36/BX)

## (undated) DEVICE — WATER IRRIGATION STERILE 1000ML (12EA/CA)

## (undated) DEVICE — SENSOR SPO2 NEO LNCS ADHESIVE (20/BX) SEE USER NOTES

## (undated) DEVICE — PROTECTOR ULNA NERVE - (36PR/CA)

## (undated) DEVICE — GLOVE BIOGEL SZ 7 SURGICAL PF LTX - (50PR/BX 4BX/CA)

## (undated) DEVICE — BLADE SURGICAL #11 - (50/BX)

## (undated) DEVICE — LENS/HOOD FOR SPACESUIT - (32/PK) PEEL AWAY FACE

## (undated) DEVICE — HEAD HOLDER JUNIOR/ADULT

## (undated) DEVICE — KIT ROOM DECONTAMINATION

## (undated) DEVICE — LACTATED RINGERS INJ 1000 ML - (14EA/CA 60CA/PF)

## (undated) DEVICE — GLOVE BIOGEL SZ 8 SURGICAL PF LTX - (50PR/BX 4BX/CA)

## (undated) DEVICE — ELECTRODE DUAL RETURN W/ CORD - (50/PK)

## (undated) DEVICE — CHLORAPREP 26 ML APPLICATOR - ORANGE TINT(25/CA)

## (undated) DEVICE — TUBING CLEARLINK DUO-VENT - C-FLO (48EA/CA)

## (undated) DEVICE — TUBE E-T HI-LO CUFF 7.0MM (10EA/PK)

## (undated) DEVICE — DRAPE LAPAROTOMY T SHEET - (12EA/CA)

## (undated) DEVICE — SUCTION INSTRUMENT YANKAUER BULBOUS TIP W/O VENT (50EA/CA)

## (undated) DEVICE — BAG, SPONGE COUNT 50600

## (undated) DEVICE — DRESSING TRANSPARENT FILM TEGADERM 4 X 4.75" (50EA/BX)"

## (undated) DEVICE — BLADE SURGICAL CLIPPER - (50EA/CA)

## (undated) DEVICE — STERI STRIP COMPOUND BENZOIN - TINCTURE 0.6ML WITH APPLICATOR (40EA/BX)

## (undated) DEVICE — CANISTER SUCTION 3000ML MECHANICAL FILTER AUTO SHUTOFF MEDI-VAC NONSTERILE LF DISP  (40EA/CA)

## (undated) DEVICE — SPONGE GAUZESTER 4 X 4 4PLY - (128PK/CA)

## (undated) DEVICE — SUTURE 3-0 VICRYL PLUS SH - 27 INCH (36/BX)

## (undated) DEVICE — BLADE SAGITTAL SAW DUAL CUT 75.0 X 25.0MM (1/EA)

## (undated) DEVICE — SYSTEM PREVENA INCISION MNGM - (1/EA)

## (undated) DEVICE — SUTURE 1 VICRYL PLUS CTX - 8 X 18 INCH (12/BX)

## (undated) DEVICE — GOWN WARMING STANDARD FLEX - (30/CA)

## (undated) DEVICE — MANIFOLD NEPTUNE 1 PORT (20/PK)

## (undated) DEVICE — GLOVE BIOGEL SZ 7.5 SURGICAL PF LTX - (50PR/BX 4BX/CA)

## (undated) DEVICE — SUTURE 0 ETHIBOND CT-2 C/R 8 X 18 (12PK/BX)"

## (undated) DEVICE — CLOSURE SKIN STRIP 1/2 X 4 IN - (STERI STRIP) (50/BX 4BX/CA)

## (undated) DEVICE — PACK MINOR BASIN - (2EA/CA)

## (undated) DEVICE — SET LEADWIRE 5 LEAD BEDSIDE DISPOSABLE ECG (1SET OF 5/EA)

## (undated) DEVICE — CATHETER IV 20 GA X 1-1/4 ---SURG.& SDS ONLY--- (50EA/BX)

## (undated) DEVICE — HANDPIECE 10FT INTPLS SCT PLS IRRIGATION HAND CONTROL SET (6/PK)

## (undated) DEVICE — GLOVE, LITE (PAIR)

## (undated) DEVICE — SUTURE 2-0 VICRYL PLUS CT-1 - 8 X 18 INCH(12/BX)

## (undated) DEVICE — MASK ANESTHESIA ADULT  - (100/CA)

## (undated) DEVICE — DRAIN PENROSE STERILE 1/4 X - 18 IN  (25EA/BX)

## (undated) DEVICE — SUTURE 1 PDS-2 PLUS CTX - (24/BX)

## (undated) DEVICE — SUTURE 4-0 VICRYL PLUS FS-2 - 27 INCH (36/BX)

## (undated) DEVICE — DERMABOND ADVANCED - (12EA/BX)

## (undated) DEVICE — BALL COTTON STERILE 5/PK - (5/PK 25PK/CA)

## (undated) DEVICE — GLOVE BIOGEL INDICATOR SZ 7.5 SURGICAL PF LTX - (50PR/BX 4BX/CA)